# Patient Record
Sex: FEMALE | Race: WHITE | NOT HISPANIC OR LATINO | Employment: OTHER | URBAN - METROPOLITAN AREA
[De-identification: names, ages, dates, MRNs, and addresses within clinical notes are randomized per-mention and may not be internally consistent; named-entity substitution may affect disease eponyms.]

---

## 2017-08-15 ENCOUNTER — TRANSCRIBE ORDERS (OUTPATIENT)
Dept: ADMINISTRATIVE | Facility: HOSPITAL | Age: 62
End: 2017-08-15

## 2017-08-15 ENCOUNTER — HOSPITAL ENCOUNTER (OUTPATIENT)
Dept: GENERAL RADIOLOGY | Facility: HOSPITAL | Age: 62
Discharge: HOME | End: 2017-08-15
Attending: FAMILY MEDICINE | Admitting: FAMILY MEDICINE

## 2017-08-15 DIAGNOSIS — J40 BRONCHITIS: ICD-10-CM

## 2017-08-15 DIAGNOSIS — J40 BRONCHITIS: Primary | ICD-10-CM

## 2017-08-15 PROCEDURE — 71020 HC CHEST PA AND LATERAL: CPT

## 2017-11-30 ENCOUNTER — LAB REQUISITION (OUTPATIENT)
Dept: LAB | Facility: HOSPITAL | Age: 62
End: 2017-11-30

## 2017-11-30 ENCOUNTER — OFFICE VISIT (OUTPATIENT)
Dept: PULMONOLOGY | Facility: CLINIC | Age: 62
End: 2017-11-30

## 2017-11-30 VITALS
SYSTOLIC BLOOD PRESSURE: 128 MMHG | HEART RATE: 84 BPM | TEMPERATURE: 97.5 F | DIASTOLIC BLOOD PRESSURE: 78 MMHG | OXYGEN SATURATION: 97 % | RESPIRATION RATE: 16 BRPM | WEIGHT: 141 LBS | BODY MASS INDEX: 25.95 KG/M2 | HEIGHT: 62 IN

## 2017-11-30 DIAGNOSIS — R05.9 COUGH: Primary | ICD-10-CM

## 2017-11-30 DIAGNOSIS — J45.991 COUGH VARIANT ASTHMA: ICD-10-CM

## 2017-11-30 DIAGNOSIS — Z00.00 ROUTINE GENERAL MEDICAL EXAMINATION AT A HEALTH CARE FACILITY: ICD-10-CM

## 2017-11-30 DIAGNOSIS — J42 CHRONIC BRONCHITIS, UNSPECIFIED CHRONIC BRONCHITIS TYPE (HCC): ICD-10-CM

## 2017-11-30 LAB
ALBUMIN SERPL-MCNC: 4.3 G/DL (ref 3.2–4.8)
ALBUMIN/GLOB SERPL: 1.4 G/DL (ref 1.5–2.5)
ALP SERPL-CCNC: 97 U/L (ref 25–100)
ALT SERPL W P-5'-P-CCNC: 43 U/L (ref 7–40)
ANION GAP SERPL CALCULATED.3IONS-SCNC: 7 MMOL/L (ref 3–11)
AST SERPL-CCNC: 45 U/L (ref 0–33)
BASOPHILS # BLD AUTO: 0.04 10*3/MM3 (ref 0–0.2)
BASOPHILS NFR BLD AUTO: 1 % (ref 0–1)
BILIRUB SERPL-MCNC: 0.8 MG/DL (ref 0.3–1.2)
BUN BLD-MCNC: 16 MG/DL (ref 9–23)
BUN/CREAT SERPL: 22.9 (ref 7–25)
CALCIUM SPEC-SCNC: 9.4 MG/DL (ref 8.7–10.4)
CHLORIDE SERPL-SCNC: 105 MMOL/L (ref 99–109)
CO2 SERPL-SCNC: 28 MMOL/L (ref 20–31)
CREAT BLD-MCNC: 0.7 MG/DL (ref 0.6–1.3)
CRP SERPL-MCNC: 0.04 MG/DL (ref 0–1)
DEPRECATED RDW RBC AUTO: 41.9 FL (ref 37–54)
EOSINOPHIL # BLD AUTO: 0.07 10*3/MM3 (ref 0–0.3)
EOSINOPHIL NFR BLD AUTO: 1.8 % (ref 0–3)
ERYTHROCYTE [DISTWIDTH] IN BLOOD BY AUTOMATED COUNT: 12.5 % (ref 11.3–14.5)
GFR SERPL CREATININE-BSD FRML MDRD: 85 ML/MIN/1.73
GLOBULIN UR ELPH-MCNC: 3 GM/DL
GLUCOSE BLD-MCNC: 93 MG/DL (ref 70–100)
HCT VFR BLD AUTO: 44.3 % (ref 34.5–44)
HGB BLD-MCNC: 15.5 G/DL (ref 11.5–15.5)
IMM GRANULOCYTES # BLD: 0 10*3/MM3 (ref 0–0.03)
IMM GRANULOCYTES NFR BLD: 0 % (ref 0–0.6)
LYMPHOCYTES # BLD AUTO: 0.97 10*3/MM3 (ref 0.6–4.8)
LYMPHOCYTES NFR BLD AUTO: 24.6 % (ref 24–44)
MCH RBC QN AUTO: 32.3 PG (ref 27–31)
MCHC RBC AUTO-ENTMCNC: 35 G/DL (ref 32–36)
MCV RBC AUTO: 92.3 FL (ref 80–99)
MONOCYTES # BLD AUTO: 0.26 10*3/MM3 (ref 0–1)
MONOCYTES NFR BLD AUTO: 6.6 % (ref 0–12)
NEUTROPHILS # BLD AUTO: 2.61 10*3/MM3 (ref 1.5–8.3)
NEUTROPHILS NFR BLD AUTO: 66 % (ref 41–71)
PLATELET # BLD AUTO: 196 10*3/MM3 (ref 150–450)
PMV BLD AUTO: 10.4 FL (ref 6–12)
POTASSIUM BLD-SCNC: 4.6 MMOL/L (ref 3.5–5.5)
PROT SERPL-MCNC: 7.3 G/DL (ref 5.7–8.2)
RBC # BLD AUTO: 4.8 10*6/MM3 (ref 3.89–5.14)
SODIUM BLD-SCNC: 140 MMOL/L (ref 132–146)
WBC NRBC COR # BLD: 3.95 10*3/MM3 (ref 3.5–10.8)

## 2017-11-30 PROCEDURE — 86003 ALLG SPEC IGE CRUDE XTRC EA: CPT | Performed by: INTERNAL MEDICINE

## 2017-11-30 PROCEDURE — 85025 COMPLETE CBC W/AUTO DIFF WBC: CPT | Performed by: INTERNAL MEDICINE

## 2017-11-30 PROCEDURE — 36415 COLL VENOUS BLD VENIPUNCTURE: CPT | Performed by: INTERNAL MEDICINE

## 2017-11-30 PROCEDURE — 90686 IIV4 VACC NO PRSV 0.5 ML IM: CPT | Performed by: INTERNAL MEDICINE

## 2017-11-30 PROCEDURE — 80053 COMPREHEN METABOLIC PANEL: CPT | Performed by: INTERNAL MEDICINE

## 2017-11-30 PROCEDURE — 94729 DIFFUSING CAPACITY: CPT | Performed by: INTERNAL MEDICINE

## 2017-11-30 PROCEDURE — G0008 ADMIN INFLUENZA VIRUS VAC: HCPCS | Performed by: INTERNAL MEDICINE

## 2017-11-30 PROCEDURE — 94726 PLETHYSMOGRAPHY LUNG VOLUMES: CPT | Performed by: INTERNAL MEDICINE

## 2017-11-30 PROCEDURE — 99204 OFFICE O/P NEW MOD 45 MIN: CPT | Performed by: INTERNAL MEDICINE

## 2017-11-30 PROCEDURE — 94375 RESPIRATORY FLOW VOLUME LOOP: CPT | Performed by: INTERNAL MEDICINE

## 2017-11-30 PROCEDURE — 86140 C-REACTIVE PROTEIN: CPT | Performed by: INTERNAL MEDICINE

## 2017-11-30 PROCEDURE — 82785 ASSAY OF IGE: CPT | Performed by: INTERNAL MEDICINE

## 2017-11-30 RX ORDER — IBUPROFEN 200 MG
400 TABLET ORAL EVERY 6 HOURS PRN
COMMUNITY

## 2017-11-30 RX ORDER — DESVENLAFAXINE SUCCINATE 50 MG/1
50 TABLET, EXTENDED RELEASE ORAL DAILY
Refills: 0 | COMMUNITY
Start: 2017-11-11

## 2017-11-30 RX ORDER — BUPROPION HYDROCHLORIDE 300 MG/1
1 TABLET ORAL EVERY MORNING
Refills: 0 | COMMUNITY
Start: 2017-11-11

## 2017-11-30 NOTE — PROGRESS NOTES
"Pulmonary Clinic  Initial Office Evaluation     REFERRING PHYSICIAN:  Navjot Harris MD    Subjective   Reason for Visit:   Ms. Janna Muñiz is a 62 y.o. female, who presents to [facility] at [place], [place], for evaluation and management of Bronchitis      SUBJECTIVE     Cough for several years. She has had frequent infections, every year, more recently 3-4 \"bronchitis\" in the last 12 months. Last episode, around JUL-SEP, it took several rounds of antibiotics to get rid of. No steroids. CXR back then was normal.    Cough at present is non productive. No sputum production. No hemoptysis. No wheezing. No chest pain. No using any inhalers. She never smoked but she has been exposed to second hand smoking.    She also has frequent urinary infections.    She thinks that she has not been tested for immunodeficiencies.    She denies PND or reflux symptoms.    PMH: She  has a past medical history of Arthritis (1980); Bladder infection; Bronchitis; Depression (2003); and Fibromyalgia (1999).   PSxH: She  has a past surgical history that includes Cholecystectomy (2001); Hysterectomy (2002); and Partial hysterectomy (9991-9580).      Medications:     Current Outpatient Prescriptions:   •  buPROPion XL (WELLBUTRIN XL) 300 MG 24 hr tablet, 1 tablet Daily., Disp: , Rfl: 0  •  desvenlafaxine (PRISTIQ) 50 MG 24 hr tablet, 1 tablet Daily., Disp: , Rfl: 0  •  ibuprofen (ADVIL,MOTRIN) 200 MG tablet, Take 200 mg by mouth Every 6 (Six) Hours As Needed for Mild Pain ., Disp: , Rfl:   No current facility-administered medications for this visit.     Allergies: She is allergic to iodine; sulfa antibiotics; and paxil [paroxetine].   FH: Her family history includes COPD in her father; Cancer in her maternal grandfather; Depression in her father and sister; Heart disease in her brother, maternal grandmother, and mother; Mental illness in her paternal grandmother; No Known Problems in her paternal " "grandfather.   SH: She  reports that she has never smoked. She has never used smokeless tobacco. She reports that she does not drink alcohol or use illicit drugs.  Single     The patient's relevant past medical, surgical and social history were reviewed and updated in Epic as appropriate.    ROS (14):   Review of Systems   Constitutional: Positive for fatigue.   HENT: Positive for rhinorrhea and tinnitus.    Eyes: Positive for redness and itching.   Respiratory: Positive for cough.    Gastrointestinal: Positive for abdominal pain, constipation and nausea.   Endocrine: Positive for polydipsia.   Genitourinary: Positive for hematuria.   Musculoskeletal: Positive for arthralgias, back pain, joint swelling, myalgias, neck pain and neck stiffness.   Neurological: Positive for tremors.   Psychiatric/Behavioral: Positive for dysphoric mood and sleep disturbance. The patient is nervous/anxious.        Objective   OBJECTIVE     Physical Examination   Vitals:    11/30/17 0930   BP: 128/78   Pulse: 84   Resp: 16   Temp: 97.5 °F (36.4 °C)   SpO2: 97%  Comment: RA   Weight: 141 lb (64 kg)   Height: 62\" (157.5 cm)       Body mass index is 25.79 kg/(m^2).    Physical Examination    Constitutional:  No acute distress.   Neck:  Normal nasal mucosa and turbinates. Normal teeth.  Oropharynx clear.  Normal range of motion. Neck supple.   No JVD present. No tracheal deviation present.  No thyromegaly present.    Cardiovascular: Normal rate, regular and rhythm. Normal heart sounds.  No murmurs, gallop or rub.  No peripheral edema.   Respiratory: No respiratory distress. Normal respiratory effort.  Normal breath sounds  Clear to auscultation and percussion.    Abdominal:  Soft. No masses. Non-tender. No distension. No HSM.   Extremities: No digital cyanosis. No clubbing.   Lymphadenopathy: None.   Skin: Skin is warm and dry. No rashes, lesions or ulcers noted.    Neurological:   Alert and Oriented to person, place, and time.   Tremors. " "  Psychiatric:  Normal affect. Normal behavior.     Diagnostic Data    CXR 11/30/2017 NAD     PFTs 11/30/2017:  Normal Spirometry.  Lung volumes are normal.  The diffusing capacity is normal.  FEV1 2.2 L 95%    SpO2 Readings from Last 3 Encounters:   11/30/17 97%       Assessment/Plan   ASSESSMENT / PLAN     Assessment:    1. Chronic cough  1. Chronic Bronchitis  2. Other etiologies like PND, GERD, Asthma can not be ruled out, but seems unlikely from her history.  2. Depression  3. Fibromyalgias  4. Arthritis  5. Non smoker  6. Tremors  7. Frequent infections: \"Bronchitis\" and \"Bladder infections      Plan:  Orders Placed This Encounter   Procedures   • XR Chest PA & Lateral   • CT Chest Hi Resolution   • Comprehensive Metabolic Panel   • Allergens, Zone 8   • IgE   • IgG   • IgM   • IgA   • C-reactive Protein   • CBC Auto Differential   • Pulmonary Function Test   • CBC & Differential     Based on results, we may need to consider Methacholine Challenge test.    Patient Instructions   1. High Resolution CT of Chest (it does not require contrast)  2. Blood work up: Blood count, chemistry and Immunoglobulins levels and allergy tests.  3. Return in 2 weeks with results  4. Pneumovax and Flu shot today.      Return in about 2 weeks (around 12/14/2017).    I discussed the diagnosis, evaluation, and  treatment options with the patient and/or appropriate family members.    Thank you very much for allowing me to participate in the care of your patient.    I spent 45 minutes with the patient. I spent > 50% percent of this time counseling and discussing diagnostic testing.    Moe Denny MD, FACP, FCCP, [facility]  Intensive Care Medicine and Pulmonary Medicine     C.C.: Navjot Harris MD  "

## 2017-11-30 NOTE — PATIENT INSTRUCTIONS
1. High Resolution CT of Chest (it does not require contrast)  2. Blood work up: Blood count, chemistry and Immunoglobulins levels and allergy tests.  3. Return in 2 weeks with results  4. Pneumovax and Flu shot today.

## 2017-12-01 LAB
IGA SERPL-MCNC: 114 MG/DL (ref 87–352)
IGG SERPL-MCNC: 1436 MG/DL (ref 700–1600)
IGM SERPL-MCNC: 142 MG/DL (ref 26–217)
TOTAL IGE SMQN RAST: 3 IU/ML (ref 0–100)

## 2017-12-04 LAB
A ALTERNATA IGE QN: <0.1 KU/L
A FUMIGATUS IGE QN: <0.1 KU/L
AMER ROACH IGE QN: <0.1 KU/L
BAHIA GRASS IGE QN: <0.1 KU/L
BERMUDA GRASS IGE QN: <0.1 KU/L
BOXELDER IGE QN: <0.1 KU/L
C HERBARUM IGE QN: <0.1 KU/L
CAT DANDER IGG QN: <0.1 KU/L
CMN PIGWEED IGE QN: <0.1 KU/L
COMMON RAGWEED IGE QN: <0.1 KU/L
CONV CLASS DESCRIPTION: NORMAL
D FARINAE IGE QN: <0.1 KU/L
D PTERONYSS IGE QN: <0.1 KU/L
DOG DANDER IGE QN: <0.1 KU/L
ENGL PLANTAIN IGE QN: <0.1 KU/L
HAZELNUT POLN IGE QN: <0.1 KU/L
JOHNSON GRASS IGE QN: <0.1 KU/L
KENT BLUE GRASS IGE QN: <0.1 KU/L
M RACEMOSUS IGE QN: <0.1 KU/L
MT JUNIPER IGE QN: <0.1 KU/L
MUGWORT IGE QN: <0.1 KU/L
NETTLE IGE QN: <0.1 KU/L
P NOTATUM IGE QN: <0.1 KU/L
S BOTRYOSUM IGE QN: <0.1 KU/L
SHEEP SORREL IGE QN: <0.1 KU/L
SWEET GUM IGE QN: <0.1 KU/L
T011-IGE MAPLE LEAF SYCAMORE: <0.1 KU/L
WHITE ELM IGE QN: <0.1 KU/L
WHITE HICKORY IGE QN: <0.1 KU/L
WHITE MULBERRY IGE QN: <0.1 KU/L
WHITE OAK IGE QN: <0.1 KU/L

## 2017-12-07 ENCOUNTER — HOSPITAL ENCOUNTER (OUTPATIENT)
Dept: CT IMAGING | Facility: HOSPITAL | Age: 62
Discharge: HOME | End: 2017-12-07
Attending: INTERNAL MEDICINE | Admitting: INTERNAL MEDICINE

## 2017-12-07 DIAGNOSIS — R05.9 COUGH: ICD-10-CM

## 2017-12-07 PROCEDURE — 71250 CT THORAX DX C-: CPT

## 2017-12-14 ENCOUNTER — OFFICE VISIT (OUTPATIENT)
Dept: PULMONOLOGY | Facility: CLINIC | Age: 62
End: 2017-12-14

## 2017-12-14 VITALS
SYSTOLIC BLOOD PRESSURE: 122 MMHG | RESPIRATION RATE: 16 BRPM | BODY MASS INDEX: 25.21 KG/M2 | OXYGEN SATURATION: 96 % | WEIGHT: 137 LBS | TEMPERATURE: 98.1 F | HEART RATE: 78 BPM | DIASTOLIC BLOOD PRESSURE: 80 MMHG | HEIGHT: 62 IN

## 2017-12-14 DIAGNOSIS — R91.1 LUNG NODULE SEEN ON IMAGING STUDY: ICD-10-CM

## 2017-12-14 DIAGNOSIS — C34.11 MALIGNANT NEOPLASM OF UPPER LOBE OF RIGHT LUNG (HCC): ICD-10-CM

## 2017-12-14 DIAGNOSIS — J41.0 SIMPLE CHRONIC BRONCHITIS (HCC): ICD-10-CM

## 2017-12-14 DIAGNOSIS — R91.1 LUNG NODULE: Primary | ICD-10-CM

## 2017-12-14 PROCEDURE — 99214 OFFICE O/P EST MOD 30 MIN: CPT | Performed by: INTERNAL MEDICINE

## 2017-12-14 NOTE — PROGRESS NOTES
"Pulmonary Follow-up Visit     Subjective   Reason for Visit:   Ms. Janna Muñiz is a 62 y.o. female, who present to the clinic for: Follow-up (CT)      SUBJECTIVE   LAST OFFICE VISIT:  11/30/2017    Doing fair.  Still some cough.  Always anxious.  Here to discuss results of tests including HRCT.     Tobacco History: She  reports that she has never smoked. She has never used smokeless tobacco.     The patient's relevant medications, past medical, surgical, family, and social history were reviewed and updated in Epic as appropriate.     ROS:  Review of Systems   Constitutional: Positive for fatigue. Negative for fever.   Eyes: Positive for redness and itching.   Respiratory: Positive for cough.    Cardiovascular: Negative for chest pain.   Gastrointestinal: Positive for constipation. Negative for nausea and vomiting.   Endocrine: Positive for polydipsia.   Genitourinary: Positive for hematuria.   Musculoskeletal: Positive for arthralgias, back pain, joint swelling and myalgias.   Neurological: Positive for tremors.   Psychiatric/Behavioral: Negative for confusion.   All other systems reviewed and are negative.      Objective   OBJECTIVE     Vitals:    12/14/17 1020   BP: 122/80   Pulse: 78   Resp: 16   Temp: 98.1 °F (36.7 °C)   SpO2: 96%  Comment: RA   Weight: 62.1 kg (137 lb)   Height: 157.5 cm (62\")     Body mass index is 25.06 kg/(m^2).    Physical Examination    Constitutional:  No acute distress.   Cardiovascular: Normal rate, regular and rhythm. Normal heart sounds.  No murmurs, gallop or rub.   Respiratory: No respiratory distress. Normal respiratory effort.  Normal breath sounds  Clear to auscultation and percussion.    Abdominal:  Soft. No masses. Non-tender. No distension. No HSM.   Extremities: No digital cyanosis. No clubbing.  No peripheral edema.   Skin: Warm and dry. No rashes, lesions or ulcers noted.    Neurological:   Alert and Oriented to person, place, and time.    Psychiatric:  Normal affect. " "Normal behavior.     Diagnostic Data    Ct Chest Hi Resolution    Result Date: 12/8/2017  1. There is an 8 x 10 mm spiculated ill-defined nodule posterior segment right upper lobe which is considered suspicious. Consider PET datasets. 2. Also, recommend consideration for referral to [facility]. 3. Otherwise, there is micronodular postinflammatory scarring and only small areas of patchy groundglass postinflammatory insults at the lung bases. This could be from low-grade mycobacterium avium complex. 4. Otherwise, there is no widespread interstitial fibrotic lung disease, groundglass disease or perimeter fibrosis of the pulmonary lobular septations. 5. The dominant finding is the worrisome nodule right upper lobe. Please see above description and recommendation. At the very least, followup exam recommended 3 months.      D:  12/08/2017 E:  12/08/2017  This report was finalized on 12/8/2017 3:04 PM by Dr. Shawn Boothe MD.          SpO2 Readings from Last 3 Encounters:   12/14/17 96%   11/30/17 97%       Assessment/Plan   ASSESSMENT / PLAN     12/7/2017      Assessment:    1. RUL, posterior segment, 8 x 10 mm spiculated nodule.  1. Suspicious for cancer. Anastasia's probability: 21% (as a non smoker) - 36% (as a smoker, \"second hand smoke\")  2. Chronic Cough  3. Fibromyalgia  4. Anxiety  5. Tremors  6. Non smoker, but exposed to second hand smoking.      Plan:  Orders Placed This Encounter   Procedures   • CT Chest Without Contrast   • NM Pet Whole Body   • Fungal Antibodies, Quant   • QuantiFERON TB Client Incubated   • Cryptococcal Antigen     Patient Instructions   1. CT with iLogic Protocol  2. PET Scan  3. RTC with results. Evaluate for NAVIGATION Bronch vs Surgical resection  4. Fungal serologies and QTF GOLD (Tuberculosis)  5. Notify Lung Navigator RN    Return in about 1 week (around 12/21/2017) for Recheck with results.    I discussed the diagnosis, evaluation, and  treatment options with " the patient and/or appropriate family members.    Thank you very much for allowing me to participate in the care of your patient.    I spent 25 minutes with the patient. I spent > 50% percent of this time counseling and discussing diagnosis, diagnostic testing, evaluation and management.    Moe Denny MD, FACP, FCCP, [facility]  Intensive Care Medicine and Pulmonary Medicine     C.C.: Navjot Harris MD

## 2017-12-14 NOTE — PATIENT INSTRUCTIONS
1. CT with iLogic Protocol  2. PET Scan  3. RTC with results. Evaluate for NAVIGATION Bronch vs Surgical resection  4. Fungal serologies and QTF GOLD (Tuberculosis)  5. Notify Lung Navigator RN

## 2017-12-15 ENCOUNTER — TELEPHONE (OUTPATIENT)
Dept: OTHER | Facility: HOSPITAL | Age: 62
End: 2017-12-15

## 2017-12-18 ENCOUNTER — TELEPHONE (OUTPATIENT)
Dept: OTHER | Facility: HOSPITAL | Age: 62
End: 2017-12-18

## 2017-12-18 NOTE — TELEPHONE ENCOUNTER
Called and spoke with patient per Dr. Denny request. Introduced lung navigator role and provided contact information. Patient states she is still waiting for PET/CT appointment. Called scheduling to notify them patient is ready to schedule PET. Encouraged her to call with questions or concerns. She v/u and agreeable to plan.

## 2017-12-21 ENCOUNTER — TELEPHONE (OUTPATIENT)
Dept: OTHER | Facility: HOSPITAL | Age: 62
End: 2017-12-21

## 2017-12-21 NOTE — TELEPHONE ENCOUNTER
Called patient with PET/CT appointment. She is scheduled 12/28/2017 with 1030 arrival time for 1100 appointment. Educated her on requirements for PET. Notified pulmonary office of appointment. She v/u and agreeable to plan. She knows to call with questions or concerns.

## 2017-12-28 ENCOUNTER — HOSPITAL ENCOUNTER (OUTPATIENT)
Dept: PET IMAGING | Facility: HOSPITAL | Age: 62
Discharge: HOME | End: 2017-12-28
Attending: INTERNAL MEDICINE

## 2017-12-28 ENCOUNTER — HOSPITAL ENCOUNTER (OUTPATIENT)
Dept: PET IMAGING | Facility: HOSPITAL | Age: 62
Discharge: HOME | End: 2017-12-28
Attending: INTERNAL MEDICINE | Admitting: INTERNAL MEDICINE

## 2017-12-28 DIAGNOSIS — R91.1 LUNG NODULE: ICD-10-CM

## 2017-12-28 DIAGNOSIS — C34.11 MALIGNANT NEOPLASM OF UPPER LOBE OF RIGHT LUNG (HCC): ICD-10-CM

## 2017-12-28 LAB — GLUCOSE BLDC GLUCOMTR-MCNC: 119 MG/DL (ref 70–130)

## 2017-12-28 PROCEDURE — 82962 GLUCOSE BLOOD TEST: CPT

## 2017-12-28 PROCEDURE — A9552 F18 FDG: HCPCS | Performed by: INTERNAL MEDICINE

## 2017-12-28 PROCEDURE — 0 FLUDEOXYGLUCOSE F18 SOLUTION: Performed by: INTERNAL MEDICINE

## 2017-12-28 PROCEDURE — 78816 PET IMAGE W/CT FULL BODY: CPT

## 2017-12-28 RX ADMIN — FLUDEOXYGLUCOSE F18 1 DOSE: 300 INJECTION INTRAVENOUS at 10:57

## 2017-12-29 ENCOUNTER — TELEPHONE (OUTPATIENT)
Dept: OTHER | Facility: HOSPITAL | Age: 62
End: 2017-12-29

## 2017-12-29 ENCOUNTER — HOSPITAL ENCOUNTER (OUTPATIENT)
Dept: CT IMAGING | Facility: HOSPITAL | Age: 62
Discharge: HOME | End: 2017-12-29
Attending: INTERNAL MEDICINE | Admitting: INTERNAL MEDICINE

## 2017-12-29 DIAGNOSIS — R91.1 LUNG NODULE: ICD-10-CM

## 2017-12-29 PROCEDURE — 71250 CT THORAX DX C-: CPT

## 2017-12-29 NOTE — TELEPHONE ENCOUNTER
Patient called back and states she would rather be told scan results over the phone instead of at [facility] next week. Spoke with Savannah and permission was given for navigator to provide PET/CT results. Called patient back and provided her with PET/CT impression. Further discussion and plan will need to be done with provider. Patient v/u and knows to call with questions or concerns.

## 2017-12-29 NOTE — TELEPHONE ENCOUNTER
Left patient detailed VM for f/u with Savannah Boothe on 01/02/2018 at 1pm to review scans. Encouraged patient to return call to confirm this appointment.

## 2018-01-02 ENCOUNTER — TELEPHONE (OUTPATIENT)
Dept: OTHER | Facility: HOSPITAL | Age: 63
End: 2018-01-02

## 2018-01-02 NOTE — TELEPHONE ENCOUNTER
Called patient to relay plan. Per Eduin she will have repeat CT chest x6mo but will have f/u in office 01/16/17 at 0945 with ALEXX Ace to address cough and bronchitis. She v/u and agreeable to plan. She knows to call with questions or concerns.

## 2018-01-22 ENCOUNTER — OFFICE VISIT (OUTPATIENT)
Dept: PULMONOLOGY | Facility: CLINIC | Age: 63
End: 2018-01-22

## 2018-01-22 VITALS
HEIGHT: 62 IN | SYSTOLIC BLOOD PRESSURE: 136 MMHG | HEART RATE: 84 BPM | WEIGHT: 139.13 LBS | TEMPERATURE: 98.7 F | DIASTOLIC BLOOD PRESSURE: 90 MMHG | BODY MASS INDEX: 25.6 KG/M2 | RESPIRATION RATE: 18 BRPM | OXYGEN SATURATION: 94 %

## 2018-01-22 DIAGNOSIS — R05.9 COUGH: ICD-10-CM

## 2018-01-22 DIAGNOSIS — R91.1 LUNG NODULE: Primary | ICD-10-CM

## 2018-01-22 PROCEDURE — 99213 OFFICE O/P EST LOW 20 MIN: CPT | Performed by: NURSE PRACTITIONER

## 2018-01-22 RX ORDER — HYDROXYZINE PAMOATE 25 MG/1
CAPSULE ORAL
Refills: 0 | Status: ON HOLD | COMMUNITY
Start: 2018-01-19 | End: 2018-02-07

## 2018-01-22 NOTE — PROGRESS NOTES
[facility] Follow up    CHIEF COMPLAINT    Cough, lung nodule     HISTORY OF PRESENT ILLNESS    Janna Muñiz is a 62 y.o.female lifetime nonsmoker here today for follow up on lung nodule.    She saw Dr Denny as a new patient on 11/30/17 for chronic cough. Part of that workup included a HRCT, which revealed a 8 x 10 mm RUL spiculated nodule. She had a PET scan on 12/28/17 which was negative. She continues to have an occasional cough but denies any more episodes of bronchitis. She denies fever, chills, hemoptysis, on unexpected weight loss.     She is concerned about her immune system. She reports she's had frequent infections including UTIs and URIs over the last year.     There is no problem list on file for this patient.      Allergies   Allergen Reactions   • Iodine Other (See Comments)     blisters   • Sulfa Antibiotics Nausea And Vomiting   • Paxil [Paroxetine] Rash       Current Outpatient Prescriptions:   •  buPROPion XL (WELLBUTRIN XL) 300 MG 24 hr tablet, 1 tablet Daily., Disp: , Rfl: 0  •  desvenlafaxine (PRISTIQ) 50 MG 24 hr tablet, 1 tablet Daily., Disp: , Rfl: 0  •  hydrOXYzine (VISTARIL) 25 MG capsule, take 1 capsule by mouth twice a day if needed, Disp: , Rfl: 0  •  ibuprofen (ADVIL,MOTRIN) 200 MG tablet, Take 200 mg by mouth Every 6 (Six) Hours As Needed for Mild Pain ., Disp: , Rfl:   MEDICATION LIST AND ALLERGIES REVIEWED.    Social History   Substance Use Topics   • Smoking status: Never Smoker   • Smokeless tobacco: Never Used   • Alcohol use No       FAMILY AND SOCIAL HISTORY REVIEWED.    Review of Systems   Constitutional: Negative for chills, fatigue, fever and unexpected weight change.   HENT: Negative for congestion, nosebleeds, postnasal drip, rhinorrhea, sinus pressure and trouble swallowing.    Respiratory: Positive for cough. Negative for chest tightness, shortness of breath and wheezing.    Cardiovascular: Negative for chest pain and leg swelling.   Gastrointestinal:  "Negative for abdominal pain, constipation, diarrhea, nausea and vomiting.   Genitourinary: Negative for dysuria, frequency, hematuria and urgency.   Musculoskeletal: Negative for myalgias.   Neurological: Negative for dizziness, weakness, numbness and headaches.   All other systems reviewed and are negative.  .    /90 (BP Location: Left arm, Patient Position: Sitting)  Pulse 84  Temp 98.7 °F (37.1 °C)  Resp 18  Ht 157.5 cm (62.01\")  Wt 63.1 kg (139 lb 2 oz)  SpO2 94%  BMI 25.44 kg/m2    Physical Exam   Constitutional: She is oriented to person, place, and time. She appears well-developed. No distress.   HENT:   Head: Normocephalic and atraumatic.   Neck: Neck supple.   Cardiovascular: Normal rate and regular rhythm.    No murmur heard.  Pulmonary/Chest: Effort normal and breath sounds normal. No respiratory distress. She has no wheezes.   Abdominal: Soft.   Musculoskeletal: Normal range of motion. She exhibits no edema.   Neurological: She is oriented to person, place, and time.   Skin: Skin is warm and dry. No erythema.   Psychiatric: She has a normal mood and affect. Her behavior is normal.   Vitals reviewed.        RESULTS    Ct Chest Without Contrast    Result Date: 12/30/2017  Stable nodular scarring right upper lobe along with adjacent subcentimeter noncalcified pulmonary nodules in similar appearance to the 12/07/2017 exam. 10 mm spiculated noncalcified nodule has been proven non hypermetabolic on recent PET/CT evaluation dated 12/28/2017. Attention on follow-up examination is recommended within 6 months to ensure stability.  DICTATED:     12/29/2017 EDITED:          12/30/2017  This report was finalized on 12/30/2017 2:25 PM by Dr. Tarik Tavares.      Ct Chest Hi Resolution    Result Date: 12/8/2017  1. There is an 8 x 10 mm spiculated ill-defined nodule posterior segment right upper lobe which is considered suspicious. Consider PET datasets. 2. Also, recommend consideration for referral to " "[facility]. 3. Otherwise, there is micronodular postinflammatory scarring and only small areas of patchy groundglass postinflammatory insults at the lung bases. This could be from low-grade mycobacterium avium complex. 4. Otherwise, there is no widespread interstitial fibrotic lung disease, groundglass disease or perimeter fibrosis of the pulmonary lobular septations. 5. The dominant finding is the worrisome nodule right upper lobe. Please see above description and recommendation. At the very least, followup exam recommended 3 months.  D:  12/08/2017 E:  12/08/2017  This report was finalized on 12/8/2017 3:04 PM by Dr. Shawn Boothe MD.      Nm Pet Whole Body    Result Date: 12/28/2017  Normal examination. There is no increased metabolic activity in a tiny right apical pulmonary nodule and the scan is otherwise, likewise normal.  D:  12/28/2017 E:  12/28/2017    This report was finalized on 12/28/2017 3:23 PM by Dr. Mahesh Narayan MD.        PROBLEM LIST    Problem List Items Addressed This Visit     None      Visit Diagnoses     Lung nodule    -  Primary    Relevant Orders    Ambulatory Referral to Cardiothoracic Surgery    Cough                DISCUSSION    We discussed that a negative PET scan does not exclude low grade malignancy and uptake may be underestimated in nodules < 1 cm. Based on Anastasia's probability, there is a 21% (as a non smoker) - 36% (as a smoker, \"second hand smoke\") likelihood for malignancy. Dr Denny has recommended resection of the nodule and I have conveyed these recommendations to the patient and her daughter. They are agreeable.    I'll make a referral to CT surgery and have also notified lung nurse navigator, Michelle Guzman.    Follow up after surgery.    I spent 15 minutes with the patient and family. I spent > 50% percent of this time counseling and discussing diagnosis, diagnostic testing, current status and treatment options.    Savannah Boothe, " APRN  01/22/20181:16 PM  Electronically signed     Please note that portions of this note were completed with a voice recognition program. Efforts were made to edit the dictations, but occasionally words are mistranscribed.      CC: Navjot Harris MD

## 2018-02-01 ENCOUNTER — OFFICE VISIT (OUTPATIENT)
Dept: CARDIAC SURGERY | Facility: CLINIC | Age: 63
End: 2018-02-01

## 2018-02-01 ENCOUNTER — HOSPITAL ENCOUNTER (OUTPATIENT)
Dept: GENERAL RADIOLOGY | Facility: HOSPITAL | Age: 63
Discharge: HOME | End: 2018-02-01
Admitting: PHYSICIAN ASSISTANT

## 2018-02-01 ENCOUNTER — HOSPITAL ENCOUNTER (OUTPATIENT)
Dept: PULMONOLOGY | Facility: HOSPITAL | Age: 63
Discharge: HOME | End: 2018-02-01

## 2018-02-01 ENCOUNTER — APPOINTMENT (OUTPATIENT)
Dept: PREADMISSION TESTING | Facility: HOSPITAL | Age: 63
End: 2018-02-01

## 2018-02-01 ENCOUNTER — PREP FOR SURGERY (OUTPATIENT)
Dept: OTHER | Facility: HOSPITAL | Age: 63
End: 2018-02-01

## 2018-02-01 VITALS
OXYGEN SATURATION: 93 % | DIASTOLIC BLOOD PRESSURE: 68 MMHG | WEIGHT: 138.8 LBS | SYSTOLIC BLOOD PRESSURE: 109 MMHG | BODY MASS INDEX: 24.59 KG/M2 | HEART RATE: 76 BPM | TEMPERATURE: 98.3 F | HEIGHT: 63 IN

## 2018-02-01 VITALS — HEIGHT: 63 IN | BODY MASS INDEX: 24.57 KG/M2 | WEIGHT: 138.67 LBS | OXYGEN SATURATION: 95 %

## 2018-02-01 DIAGNOSIS — R91.1 LUNG NODULE: ICD-10-CM

## 2018-02-01 DIAGNOSIS — R91.8 MASS OF UPPER LOBE OF RIGHT LUNG: Primary | ICD-10-CM

## 2018-02-01 DIAGNOSIS — R91.1 SOLITARY PULMONARY NODULE: Primary | ICD-10-CM

## 2018-02-01 DIAGNOSIS — R91.1 LUNG NODULE: Primary | ICD-10-CM

## 2018-02-01 DIAGNOSIS — Z01.89 LABORATORY TEST: Primary | ICD-10-CM

## 2018-02-01 LAB
ABO GROUP BLD: NORMAL
ALBUMIN SERPL-MCNC: 4.6 G/DL (ref 3.2–4.8)
ALP SERPL-CCNC: 97 U/L (ref 25–100)
ALT SERPL W P-5'-P-CCNC: 40 U/L (ref 7–40)
ANION GAP SERPL CALCULATED.3IONS-SCNC: 8 MMOL/L (ref 3–11)
AST SERPL-CCNC: 49 U/L (ref 0–33)
BASOPHILS # BLD AUTO: 0.03 10*3/MM3 (ref 0–0.2)
BASOPHILS NFR BLD AUTO: 0.6 % (ref 0–1)
BILIRUB CONJ SERPL-MCNC: 0.3 MG/DL (ref 0–0.2)
BILIRUB INDIRECT SERPL-MCNC: 0.8 MG/DL (ref 0.1–1.1)
BILIRUB SERPL-MCNC: 1.1 MG/DL (ref 0.3–1.2)
BUN BLD-MCNC: 9 MG/DL (ref 9–23)
BUN/CREAT SERPL: 11.3 (ref 7–25)
CALCIUM SPEC-SCNC: 9.7 MG/DL (ref 8.7–10.4)
CHLORIDE SERPL-SCNC: 102 MMOL/L (ref 99–109)
CO2 SERPL-SCNC: 30 MMOL/L (ref 20–31)
CREAT BLD-MCNC: 0.8 MG/DL (ref 0.6–1.3)
DEPRECATED RDW RBC AUTO: 40.4 FL (ref 37–54)
EOSINOPHIL # BLD AUTO: 0.1 10*3/MM3 (ref 0–0.3)
EOSINOPHIL NFR BLD AUTO: 2 % (ref 0–3)
ERYTHROCYTE [DISTWIDTH] IN BLOOD BY AUTOMATED COUNT: 12.3 % (ref 11.3–14.5)
GFR SERPL CREATININE-BSD FRML MDRD: 73 ML/MIN/1.73
GLUCOSE BLD-MCNC: 87 MG/DL (ref 70–100)
HBA1C MFR BLD: 4.9 % (ref 4.8–5.6)
HCT VFR BLD AUTO: 44.4 % (ref 34.5–44)
HGB BLD-MCNC: 15.8 G/DL (ref 11.5–15.5)
IMM GRANULOCYTES # BLD: 0.01 10*3/MM3 (ref 0–0.03)
IMM GRANULOCYTES NFR BLD: 0.2 % (ref 0–0.6)
INR PPP: 1.01
LYMPHOCYTES # BLD AUTO: 1.1 10*3/MM3 (ref 0.6–4.8)
LYMPHOCYTES NFR BLD AUTO: 21.8 % (ref 24–44)
MCH RBC QN AUTO: 32.2 PG (ref 27–31)
MCHC RBC AUTO-ENTMCNC: 35.6 G/DL (ref 32–36)
MCV RBC AUTO: 90.4 FL (ref 80–99)
MONOCYTES # BLD AUTO: 0.35 10*3/MM3 (ref 0–1)
MONOCYTES NFR BLD AUTO: 6.9 % (ref 0–12)
NEUTROPHILS # BLD AUTO: 3.46 10*3/MM3 (ref 1.5–8.3)
NEUTROPHILS NFR BLD AUTO: 68.5 % (ref 41–71)
PLATELET # BLD AUTO: 182 10*3/MM3 (ref 150–450)
PMV BLD AUTO: 9.7 FL (ref 6–12)
POTASSIUM BLD-SCNC: 4.1 MMOL/L (ref 3.5–5.5)
PROT SERPL-MCNC: 7.9 G/DL (ref 5.7–8.2)
PROTHROMBIN TIME: 11 SECONDS (ref 9.6–11.5)
RBC # BLD AUTO: 4.91 10*6/MM3 (ref 3.89–5.14)
RH BLD: POSITIVE
SODIUM BLD-SCNC: 140 MMOL/L (ref 132–146)
WBC NRBC COR # BLD: 5.05 10*3/MM3 (ref 3.5–10.8)

## 2018-02-01 PROCEDURE — 99203 OFFICE O/P NEW LOW 30 MIN: CPT | Performed by: THORACIC SURGERY (CARDIOTHORACIC VASCULAR SURGERY)

## 2018-02-01 PROCEDURE — 93010 ELECTROCARDIOGRAM REPORT: CPT | Performed by: INTERNAL MEDICINE

## 2018-02-01 PROCEDURE — 71046 X-RAY EXAM CHEST 2 VIEWS: CPT

## 2018-02-01 PROCEDURE — 94010 BREATHING CAPACITY TEST: CPT

## 2018-02-01 PROCEDURE — 93005 ELECTROCARDIOGRAM TRACING: CPT

## 2018-02-01 PROCEDURE — 85610 PROTHROMBIN TIME: CPT | Performed by: PHYSICIAN ASSISTANT

## 2018-02-01 PROCEDURE — 94010 BREATHING CAPACITY TEST: CPT | Performed by: INTERNAL MEDICINE

## 2018-02-01 PROCEDURE — 85025 COMPLETE CBC W/AUTO DIFF WBC: CPT | Performed by: PHYSICIAN ASSISTANT

## 2018-02-01 PROCEDURE — 80048 BASIC METABOLIC PNL TOTAL CA: CPT | Performed by: PHYSICIAN ASSISTANT

## 2018-02-01 PROCEDURE — 86901 BLOOD TYPING SEROLOGIC RH(D): CPT

## 2018-02-01 PROCEDURE — 80076 HEPATIC FUNCTION PANEL: CPT | Performed by: PHYSICIAN ASSISTANT

## 2018-02-01 PROCEDURE — 36415 COLL VENOUS BLD VENIPUNCTURE: CPT

## 2018-02-01 PROCEDURE — 83036 HEMOGLOBIN GLYCOSYLATED A1C: CPT | Performed by: PHYSICIAN ASSISTANT

## 2018-02-01 PROCEDURE — 86900 BLOOD TYPING SEROLOGIC ABO: CPT

## 2018-02-01 RX ORDER — CHLORHEXIDINE GLUCONATE 500 MG/1
1 CLOTH TOPICAL EVERY 12 HOURS PRN
Status: ACTIVE | OUTPATIENT
Start: 2018-02-01

## 2018-02-01 RX ORDER — CHLORHEXIDINE GLUCONATE 500 MG/1
1 CLOTH TOPICAL EVERY 12 HOURS PRN
Status: CANCELLED | OUTPATIENT
Start: 2018-02-01

## 2018-02-01 RX ORDER — CEFAZOLIN SODIUM 2 G/100ML
2 INJECTION, SOLUTION INTRAVENOUS ONCE
Status: CANCELLED | OUTPATIENT
Start: 2018-02-01 | End: 2018-02-01

## 2018-02-01 NOTE — PAT
Patient to apply Chlorhexadine wipes  to surgical area (as instructed) the night before procedure and the AM of procedure. Wipes provided.    Pt given bactroban to administer the night before surgery    ra o2 95%    MEASURED FOR TEDS/SCDS IN PAT  CALF MEASUREMENT    13in  LENGTH MEASUREMENT 14in

## 2018-02-01 NOTE — PROGRESS NOTES
02/01/2018  Patient Information  Janna Muñiz                                                                                          [address] [facility]  [address] [place] [address]   1955  'PCP/Referring Physician'  Navjot Harris MD  [phone]  Savannah Boothe, APRN  [phone]  Chief Complaint   Patient presents with   • Lung Nodule     Referred by Dr. Moe Denny for a lung nodule   • Cough     CC: I have a spot on my lung  History of Present Illness: Pleasant 62-year-old  female being seen at the request of Dr. Harris for evaluation of a right upper lobe solitary pulmonary nodule (spiculated, 8-10 millimeter).  For the past 3 months or thereabouts this patient has had a persistent cough and has been treated on several occasions for bronchitis.  Chest x-rays have been negative however recent CT scan revealed a spiculated nodule in the right upper lobe PET scan was read as showing no increase in metabolic activity in the lesion I have reviewed the CT scan.  The lesion in question has the typical appearance of an early carcinoma of the lung.  This patient denies weight loss, hemoptysis, chest pain, shortness of breath, and dyspnea.  She has a lifelong nonsmoker but in childhood had substantial exposure to secondhand smoke (mother).  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  To her knowledge she has not had exposure to asbestos.      Patient Active Problem List   Diagnosis   • Fibromyalgia     Past Medical History:   Diagnosis Date   • Anxiety    • Arthritis 1980   • Bladder infection    • Bronchitis    • Depression 2003    severe   • Fibromyalgia 1999     Past Surgical History:   Procedure Laterality Date   • CHOLECYSTECTOMY  2001   • HYSTERECTOMY  2002   • PARTIAL HYSTERECTOMY  [id number]       Current Outpatient Prescriptions:   •  buPROPion XL (WELLBUTRIN XL) 300 MG 24 hr tablet, 1 tablet Daily., Disp: , Rfl: 0  •  desvenlafaxine (PRISTIQ) 50 MG 24 hr  tablet, 1 tablet Daily., Disp: , Rfl: 0  •  hydrOXYzine (VISTARIL) 25 MG capsule, take 1 capsule by mouth twice a day if needed, Disp: , Rfl: 0  •  ibuprofen (ADVIL,MOTRIN) 200 MG tablet, Take 200 mg by mouth Every 6 (Six) Hours As Needed for Mild Pain ., Disp: , Rfl:   Allergies   Allergen Reactions   • Darvon [Propoxyphene] GI Intolerance   • Iodine Other (See Comments)     blisters   • Percocet [Oxycodone-Acetaminophen] GI Intolerance   • Sulfa Antibiotics Nausea And Vomiting   • Paxil [Paroxetine] Rash     Social History     Social History   • Marital status: Single     Spouse name: N/A   • Number of children: 3   • Years of education: N/A     Occupational History   •  Disabled     Social History Main Topics   • Smoking status: Never Smoker   • Smokeless tobacco: Never Used   • Alcohol use No   • Drug use: No   • Sexual activity: Defer     Other Topics Concern   • Not on file     Social History Narrative    Lives in [place], [place] with granddaughter     Family History   Problem Relation Age of Onset   • Heart disease Mother    • COPD Father    • Depression Father    • Depression Sister    • Heart disease Brother    • Heart disease Maternal Grandmother    • Cancer Maternal Grandfather    • Mental illness Paternal Grandmother    • No Known Problems Paternal Grandfather      Review of Systems   Constitution: Positive for malaise/fatigue. Negative for chills, fever, night sweats and weight loss.   HENT: Negative for hearing loss, odynophagia and sore throat.    Cardiovascular: Negative for chest pain, dyspnea on exertion, leg swelling, orthopnea and palpitations.   Respiratory: Positive for cough. Negative for hemoptysis.    Endocrine: Negative for cold intolerance, heat intolerance, polydipsia, polyphagia and polyuria.   Hematologic/Lymphatic: Bruises/bleeds easily.   Skin: Negative for itching and rash.   Musculoskeletal: Positive for arthritis. Negative for joint pain, joint swelling and myalgias.  "  Gastrointestinal: Positive for constipation. Negative for abdominal pain, diarrhea, hematemesis, hematochezia, melena, nausea and vomiting.   Genitourinary: Negative for dysuria, frequency and hematuria.   Neurological: Negative for focal weakness, headaches, numbness and seizures.   Psychiatric/Behavioral: Positive for depression. Negative for suicidal ideas. The patient is nervous/anxious.    All other systems reviewed and are negative.    Vitals:    02/01/18 0937   BP: 109/68   BP Location: Right arm   Patient Position: Sitting   Pulse: 76   Temp: 98.3 °F (36.8 °C)   SpO2: 93%   Weight: 63 kg (138 lb 12.8 oz)   Height: 160 cm (63\")      Physical Exam   Constitutional: She is oriented to person, place, and time. She appears well-developed and well-nourished. No distress.   HENT:   Head: Normocephalic.   Right Ear: External ear normal.   Left Ear: External ear normal.   Nose: Nose normal.   Eyes: Pupils are equal, round, and reactive to light.   Neck: Normal range of motion. Neck supple. No tracheal deviation present. No thyromegaly present.   Cardiovascular: Normal rate, normal heart sounds and intact distal pulses.    No murmur heard.  Pulmonary/Chest: Effort normal and breath sounds normal. No respiratory distress. She has no wheezes. She has no rales. She exhibits no tenderness.   Abdominal: Soft. Bowel sounds are normal. She exhibits no distension and no mass. There is no tenderness.   Musculoskeletal: Normal range of motion. She exhibits no edema.   Lymphadenopathy:     She has no cervical adenopathy.   Neurological: She is alert and oriented to person, place, and time. She has normal reflexes. No cranial nerve deficit.   Skin: Skin is warm and dry. No rash noted. No erythema.   Psychiatric: She has a normal mood and affect.       Labs/Imaging:I have reviewed this patient's CT scan and PET scan.  She has a 10 mm spiculated nodule in the posterior aspect of the right upper lobe.  I believe that this lesion " represents a cancer.      Assessment:Apparently healthy 62-year-old  female, nonsmoker, with a spiculated lesion in the right upper lobe (probable carcinoma).      Plan:the patient will be scheduled for video-assisted thoracotomy and biopsy with possible right upper lobectomy.  I have discussed the procedure in detail with the patient and with her daughter.  They understand the procedure and are agreeable and ready to proceed.        Patient Active Problem List   Diagnosis   • Fibromyalgia     I have reviewed, verified, and confirmed the above history and current status.  I have examined the patient and confirmed the above physical findings.    Mir Lopez MD  CTSurgery  02/02/18   2:25 PM

## 2018-02-06 ENCOUNTER — ANESTHESIA EVENT (OUTPATIENT)
Dept: PERIOP | Facility: HOSPITAL | Age: 63
End: 2018-02-06

## 2018-02-07 ENCOUNTER — APPOINTMENT (OUTPATIENT)
Dept: GENERAL RADIOLOGY | Facility: HOSPITAL | Age: 63
End: 2018-02-07

## 2018-02-07 ENCOUNTER — ANESTHESIA (OUTPATIENT)
Dept: PERIOP | Facility: HOSPITAL | Age: 63
End: 2018-02-07

## 2018-02-07 ENCOUNTER — HOSPITAL ENCOUNTER (INPATIENT)
Facility: HOSPITAL | Age: 63
LOS: 7 days | Discharge: HOME | End: 2018-02-14
Attending: THORACIC SURGERY (CARDIOTHORACIC VASCULAR SURGERY) | Admitting: THORACIC SURGERY (CARDIOTHORACIC VASCULAR SURGERY)

## 2018-02-07 DIAGNOSIS — R91.1 LUNG NODULE: ICD-10-CM

## 2018-02-07 DIAGNOSIS — Z74.09 IMPAIRED FUNCTIONAL MOBILITY, BALANCE, GAIT, AND ENDURANCE: Primary | ICD-10-CM

## 2018-02-07 DIAGNOSIS — R91.8 MASS OF UPPER LOBE OF RIGHT LUNG: ICD-10-CM

## 2018-02-07 LAB
ABO GROUP BLD: NORMAL
BLD GP AB SCN SERPL QL: NEGATIVE
POTASSIUM BLDA-SCNC: 3.71 MMOL/L (ref 3.5–5.3)
RH BLD: POSITIVE

## 2018-02-07 PROCEDURE — 25010000002 PHENYLEPHRINE PER 1 ML: Performed by: NURSE ANESTHETIST, CERTIFIED REGISTERED

## 2018-02-07 PROCEDURE — C1755 CATHETER, INTRASPINAL: HCPCS | Performed by: ANESTHESIOLOGY

## 2018-02-07 PROCEDURE — 25010000002 MORPHINE PER 10 MG: Performed by: NURSE ANESTHETIST, CERTIFIED REGISTERED

## 2018-02-07 PROCEDURE — C1755 CATHETER, INTRASPINAL: HCPCS | Performed by: THORACIC SURGERY (CARDIOTHORACIC VASCULAR SURGERY)

## 2018-02-07 PROCEDURE — 0BBC0ZX EXCISION OF RIGHT UPPER LUNG LOBE, OPEN APPROACH, DIAGNOSTIC: ICD-10-PCS | Performed by: THORACIC SURGERY (CARDIOTHORACIC VASCULAR SURGERY)

## 2018-02-07 PROCEDURE — 25010000002 PROMETHAZINE PER 50 MG: Performed by: NURSE ANESTHETIST, CERTIFIED REGISTERED

## 2018-02-07 PROCEDURE — 25010000002 NEOSTIGMINE PER 0.5 MG: Performed by: NURSE ANESTHETIST, CERTIFIED REGISTERED

## 2018-02-07 PROCEDURE — 25010000002 ONDANSETRON PER 1 MG: Performed by: NURSE ANESTHETIST, CERTIFIED REGISTERED

## 2018-02-07 PROCEDURE — 94799 UNLISTED PULMONARY SVC/PX: CPT

## 2018-02-07 PROCEDURE — 84132 ASSAY OF SERUM POTASSIUM: CPT | Performed by: ANESTHESIOLOGY

## 2018-02-07 PROCEDURE — 25010000002 FENTANYL CITRATE (PF) 100 MCG/2ML SOLUTION: Performed by: NURSE ANESTHETIST, CERTIFIED REGISTERED

## 2018-02-07 PROCEDURE — 86850 RBC ANTIBODY SCREEN: CPT | Performed by: THORACIC SURGERY (CARDIOTHORACIC VASCULAR SURGERY)

## 2018-02-07 PROCEDURE — 0BBC4ZX EXCISION OF RIGHT UPPER LUNG LOBE, PERCUTANEOUS ENDOSCOPIC APPROACH, DIAGNOSTIC: ICD-10-PCS | Performed by: THORACIC SURGERY (CARDIOTHORACIC VASCULAR SURGERY)

## 2018-02-07 PROCEDURE — 99233 SBSQ HOSP IP/OBS HIGH 50: CPT | Performed by: INTERNAL MEDICINE

## 2018-02-07 PROCEDURE — 86901 BLOOD TYPING SEROLOGIC RH(D): CPT | Performed by: THORACIC SURGERY (CARDIOTHORACIC VASCULAR SURGERY)

## 2018-02-07 PROCEDURE — 0BJ08ZZ INSPECTION OF TRACHEOBRONCHIAL TREE, VIA NATURAL OR ARTIFICIAL OPENING ENDOSCOPIC: ICD-10-PCS | Performed by: THORACIC SURGERY (CARDIOTHORACIC VASCULAR SURGERY)

## 2018-02-07 PROCEDURE — 86900 BLOOD TYPING SEROLOGIC ABO: CPT | Performed by: THORACIC SURGERY (CARDIOTHORACIC VASCULAR SURGERY)

## 2018-02-07 PROCEDURE — 25010000003 MORPHINE PER 10 MG: Performed by: NURSE ANESTHETIST, CERTIFIED REGISTERED

## 2018-02-07 PROCEDURE — C1729 CATH, DRAINAGE: HCPCS | Performed by: THORACIC SURGERY (CARDIOTHORACIC VASCULAR SURGERY)

## 2018-02-07 PROCEDURE — 32097 OPEN WEDGE/BX LUNG NODULE: CPT | Performed by: THORACIC SURGERY (CARDIOTHORACIC VASCULAR SURGERY)

## 2018-02-07 PROCEDURE — 25010000002 HYDROMORPHONE PER 4 MG: Performed by: NURSE ANESTHETIST, CERTIFIED REGISTERED

## 2018-02-07 PROCEDURE — 25010000002 PROPOFOL 10 MG/ML EMULSION: Performed by: NURSE ANESTHETIST, CERTIFIED REGISTERED

## 2018-02-07 PROCEDURE — 94640 AIRWAY INHALATION TREATMENT: CPT

## 2018-02-07 PROCEDURE — 25010000002 DEXAMETHASONE PER 1 MG: Performed by: NURSE ANESTHETIST, CERTIFIED REGISTERED

## 2018-02-07 PROCEDURE — 25010000002 MIDAZOLAM PER 1 MG: Performed by: NURSE ANESTHETIST, CERTIFIED REGISTERED

## 2018-02-07 PROCEDURE — 25010000003 CEFAZOLIN IN DEXTROSE 2-4 GM/100ML-% SOLUTION: Performed by: PHYSICIAN ASSISTANT

## 2018-02-07 PROCEDURE — 88331 PATH CONSLTJ SURG 1 BLK 1SPC: CPT | Performed by: THORACIC SURGERY (CARDIOTHORACIC VASCULAR SURGERY)

## 2018-02-07 PROCEDURE — 32097 OPEN WEDGE/BX LUNG NODULE: CPT | Performed by: PHYSICIAN ASSISTANT

## 2018-02-07 PROCEDURE — 71045 X-RAY EXAM CHEST 1 VIEW: CPT

## 2018-02-07 PROCEDURE — 88312 SPECIAL STAINS GROUP 1: CPT | Performed by: THORACIC SURGERY (CARDIOTHORACIC VASCULAR SURGERY)

## 2018-02-07 PROCEDURE — 94760 N-INVAS EAR/PLS OXIMETRY 1: CPT

## 2018-02-07 PROCEDURE — 31622 DX BRONCHOSCOPE/WASH: CPT | Performed by: THORACIC SURGERY (CARDIOTHORACIC VASCULAR SURGERY)

## 2018-02-07 PROCEDURE — 88307 TISSUE EXAM BY PATHOLOGIST: CPT | Performed by: THORACIC SURGERY (CARDIOTHORACIC VASCULAR SURGERY)

## 2018-02-07 PROCEDURE — 86923 COMPATIBILITY TEST ELECTRIC: CPT

## 2018-02-07 RX ORDER — DEXAMETHASONE SODIUM PHOSPHATE 10 MG/ML
INJECTION INTRAMUSCULAR; INTRAVENOUS AS NEEDED
Status: DISCONTINUED | OUTPATIENT
Start: 2018-02-07 | End: 2018-02-07 | Stop reason: SURG

## 2018-02-07 RX ORDER — MORPHINE SULFATE 0.5 MG/ML
INJECTION, SOLUTION EPIDURAL; INTRATHECAL; INTRAVENOUS AS NEEDED
Status: DISCONTINUED | OUTPATIENT
Start: 2018-02-07 | End: 2018-02-07 | Stop reason: SURG

## 2018-02-07 RX ORDER — MAGNESIUM HYDROXIDE 1200 MG/15ML
LIQUID ORAL AS NEEDED
Status: DISCONTINUED | OUTPATIENT
Start: 2018-02-07 | End: 2018-02-07 | Stop reason: HOSPADM

## 2018-02-07 RX ORDER — FENTANYL CITRATE 50 UG/ML
50 INJECTION, SOLUTION INTRAMUSCULAR; INTRAVENOUS
Status: DISCONTINUED | OUTPATIENT
Start: 2018-02-07 | End: 2018-02-07 | Stop reason: HOSPADM

## 2018-02-07 RX ORDER — ONDANSETRON 4 MG/1
4 TABLET, FILM COATED ORAL EVERY 6 HOURS PRN
Status: DISCONTINUED | OUTPATIENT
Start: 2018-02-07 | End: 2018-02-14 | Stop reason: HOSPADM

## 2018-02-07 RX ORDER — LIDOCAINE HYDROCHLORIDE 10 MG/ML
0.5 INJECTION, SOLUTION EPIDURAL; INFILTRATION; INTRACAUDAL; PERINEURAL ONCE AS NEEDED
Status: COMPLETED | OUTPATIENT
Start: 2018-02-07 | End: 2018-02-07

## 2018-02-07 RX ORDER — SODIUM CHLORIDE 9 MG/ML
30 INJECTION, SOLUTION INTRAVENOUS CONTINUOUS
Status: DISCONTINUED | OUTPATIENT
Start: 2018-02-07 | End: 2018-02-08

## 2018-02-07 RX ORDER — DESVENLAFAXINE SUCCINATE 50 MG/1
50 TABLET, EXTENDED RELEASE ORAL DAILY
Status: DISCONTINUED | OUTPATIENT
Start: 2018-02-07 | End: 2018-02-14 | Stop reason: HOSPADM

## 2018-02-07 RX ORDER — HYDROMORPHONE HYDROCHLORIDE 1 MG/ML
0.25 INJECTION, SOLUTION INTRAMUSCULAR; INTRAVENOUS; SUBCUTANEOUS
Status: DISCONTINUED | OUTPATIENT
Start: 2018-02-07 | End: 2018-02-08

## 2018-02-07 RX ORDER — SODIUM CHLORIDE 0.9 % (FLUSH) 0.9 %
1-10 SYRINGE (ML) INJECTION AS NEEDED
Status: DISCONTINUED | OUTPATIENT
Start: 2018-02-07 | End: 2018-02-10

## 2018-02-07 RX ORDER — HYDROXYZINE PAMOATE 25 MG/1
25 CAPSULE ORAL 2 TIMES DAILY PRN
COMMUNITY

## 2018-02-07 RX ORDER — PROMETHAZINE HYDROCHLORIDE 25 MG/1
25 SUPPOSITORY RECTAL ONCE AS NEEDED
Status: COMPLETED | OUTPATIENT
Start: 2018-02-07 | End: 2018-02-07

## 2018-02-07 RX ORDER — PROMETHAZINE HYDROCHLORIDE 25 MG/ML
6.25 INJECTION, SOLUTION INTRAMUSCULAR; INTRAVENOUS ONCE AS NEEDED
Status: COMPLETED | OUTPATIENT
Start: 2018-02-07 | End: 2018-02-07

## 2018-02-07 RX ORDER — PROMETHAZINE HYDROCHLORIDE 25 MG/1
25 TABLET ORAL ONCE AS NEEDED
Status: COMPLETED | OUTPATIENT
Start: 2018-02-07 | End: 2018-02-07

## 2018-02-07 RX ORDER — FAMOTIDINE 10 MG/ML
20 INJECTION, SOLUTION INTRAVENOUS ONCE
Status: DISCONTINUED | OUTPATIENT
Start: 2018-02-07 | End: 2018-02-07

## 2018-02-07 RX ORDER — NALOXONE HCL 0.4 MG/ML
0.4 VIAL (ML) INJECTION
Status: DISCONTINUED | OUTPATIENT
Start: 2018-02-07 | End: 2018-02-08

## 2018-02-07 RX ORDER — CEFAZOLIN SODIUM 2 G/100ML
2 INJECTION, SOLUTION INTRAVENOUS EVERY 8 HOURS
Status: COMPLETED | OUTPATIENT
Start: 2018-02-07 | End: 2018-02-08

## 2018-02-07 RX ORDER — LIDOCAINE HYDROCHLORIDE 10 MG/ML
INJECTION, SOLUTION INFILTRATION; PERINEURAL AS NEEDED
Status: DISCONTINUED | OUTPATIENT
Start: 2018-02-07 | End: 2018-02-07 | Stop reason: SURG

## 2018-02-07 RX ORDER — GLYCOPYRROLATE 0.2 MG/ML
INJECTION INTRAMUSCULAR; INTRAVENOUS AS NEEDED
Status: DISCONTINUED | OUTPATIENT
Start: 2018-02-07 | End: 2018-02-07 | Stop reason: SURG

## 2018-02-07 RX ORDER — CEFAZOLIN SODIUM 2 G/100ML
2 INJECTION, SOLUTION INTRAVENOUS ONCE
Status: COMPLETED | OUTPATIENT
Start: 2018-02-07 | End: 2018-02-07

## 2018-02-07 RX ORDER — HEPARIN SODIUM 5000 [USP'U]/ML
5000 INJECTION, SOLUTION INTRAVENOUS; SUBCUTANEOUS EVERY 12 HOURS SCHEDULED
Status: DISCONTINUED | OUTPATIENT
Start: 2018-02-08 | End: 2018-02-13

## 2018-02-07 RX ORDER — ONDANSETRON 2 MG/ML
INJECTION INTRAMUSCULAR; INTRAVENOUS AS NEEDED
Status: DISCONTINUED | OUTPATIENT
Start: 2018-02-07 | End: 2018-02-07 | Stop reason: SURG

## 2018-02-07 RX ORDER — PROPOFOL 10 MG/ML
VIAL (ML) INTRAVENOUS AS NEEDED
Status: DISCONTINUED | OUTPATIENT
Start: 2018-02-07 | End: 2018-02-07 | Stop reason: SURG

## 2018-02-07 RX ORDER — NALOXONE HCL 0.4 MG/ML
0.4 VIAL (ML) INJECTION
Status: DISCONTINUED | OUTPATIENT
Start: 2018-02-07 | End: 2018-02-13

## 2018-02-07 RX ORDER — OXYCODONE HYDROCHLORIDE AND ACETAMINOPHEN 5; 325 MG/1; MG/1
1 TABLET ORAL EVERY 4 HOURS PRN
Status: DISCONTINUED | OUTPATIENT
Start: 2018-02-07 | End: 2018-02-14 | Stop reason: HOSPADM

## 2018-02-07 RX ORDER — HYDROMORPHONE HYDROCHLORIDE 1 MG/ML
0.5 INJECTION, SOLUTION INTRAMUSCULAR; INTRAVENOUS; SUBCUTANEOUS
Status: DISCONTINUED | OUTPATIENT
Start: 2018-02-07 | End: 2018-02-07 | Stop reason: HOSPADM

## 2018-02-07 RX ORDER — FENTANYL CITRATE 50 UG/ML
INJECTION, SOLUTION INTRAMUSCULAR; INTRAVENOUS AS NEEDED
Status: DISCONTINUED | OUTPATIENT
Start: 2018-02-07 | End: 2018-02-07 | Stop reason: SURG

## 2018-02-07 RX ORDER — BUPROPION HYDROCHLORIDE 150 MG/1
300 TABLET ORAL EVERY MORNING
Status: DISCONTINUED | OUTPATIENT
Start: 2018-02-07 | End: 2018-02-14 | Stop reason: HOSPADM

## 2018-02-07 RX ORDER — SODIUM CHLORIDE, SODIUM LACTATE, POTASSIUM CHLORIDE, CALCIUM CHLORIDE 600; 310; 30; 20 MG/100ML; MG/100ML; MG/100ML; MG/100ML
9 INJECTION, SOLUTION INTRAVENOUS CONTINUOUS
Status: DISCONTINUED | OUTPATIENT
Start: 2018-02-07 | End: 2018-02-08

## 2018-02-07 RX ORDER — SODIUM CHLORIDE 9 MG/ML
INJECTION, SOLUTION INTRAVENOUS AS NEEDED
Status: DISCONTINUED | OUTPATIENT
Start: 2018-02-07 | End: 2018-02-07 | Stop reason: HOSPADM

## 2018-02-07 RX ORDER — MIDAZOLAM HYDROCHLORIDE 1 MG/ML
INJECTION INTRAMUSCULAR; INTRAVENOUS AS NEEDED
Status: DISCONTINUED | OUTPATIENT
Start: 2018-02-07 | End: 2018-02-07 | Stop reason: SURG

## 2018-02-07 RX ORDER — IPRATROPIUM BROMIDE AND ALBUTEROL SULFATE 2.5; .5 MG/3ML; MG/3ML
3 SOLUTION RESPIRATORY (INHALATION)
Status: DISCONTINUED | OUTPATIENT
Start: 2018-02-07 | End: 2018-02-09

## 2018-02-07 RX ORDER — ONDANSETRON 2 MG/ML
4 INJECTION INTRAMUSCULAR; INTRAVENOUS EVERY 6 HOURS PRN
Status: DISCONTINUED | OUTPATIENT
Start: 2018-02-07 | End: 2018-02-14 | Stop reason: HOSPADM

## 2018-02-07 RX ORDER — MORPHINE SULFATE 2 MG/ML
4 INJECTION, SOLUTION INTRAMUSCULAR; INTRAVENOUS EVERY 4 HOURS PRN
Status: DISCONTINUED | OUTPATIENT
Start: 2018-02-07 | End: 2018-02-08

## 2018-02-07 RX ORDER — BUPIVACAINE HYDROCHLORIDE 2.5 MG/ML
INJECTION, SOLUTION EPIDURAL; INFILTRATION; INTRACAUDAL AS NEEDED
Status: DISCONTINUED | OUTPATIENT
Start: 2018-02-07 | End: 2018-02-07 | Stop reason: SURG

## 2018-02-07 RX ORDER — HYDROCODONE BITARTRATE AND ACETAMINOPHEN 7.5; 325 MG/1; MG/1
1 TABLET ORAL EVERY 4 HOURS PRN
Status: DISCONTINUED | OUTPATIENT
Start: 2018-02-07 | End: 2018-02-14 | Stop reason: HOSPADM

## 2018-02-07 RX ORDER — SODIUM CHLORIDE 0.9 % (FLUSH) 0.9 %
1-10 SYRINGE (ML) INJECTION AS NEEDED
Status: DISCONTINUED | OUTPATIENT
Start: 2018-02-07 | End: 2018-02-07 | Stop reason: HOSPADM

## 2018-02-07 RX ORDER — ONDANSETRON 2 MG/ML
4 INJECTION INTRAMUSCULAR; INTRAVENOUS EVERY 6 HOURS PRN
Status: DISCONTINUED | OUTPATIENT
Start: 2018-02-07 | End: 2018-02-08

## 2018-02-07 RX ORDER — ROCURONIUM BROMIDE 10 MG/ML
INJECTION, SOLUTION INTRAVENOUS AS NEEDED
Status: DISCONTINUED | OUTPATIENT
Start: 2018-02-07 | End: 2018-02-07 | Stop reason: SURG

## 2018-02-07 RX ORDER — FAMOTIDINE 20 MG/1
20 TABLET, FILM COATED ORAL ONCE
Status: COMPLETED | OUTPATIENT
Start: 2018-02-07 | End: 2018-02-07

## 2018-02-07 RX ORDER — PROMETHAZINE HYDROCHLORIDE 25 MG/ML
6.25 INJECTION, SOLUTION INTRAMUSCULAR; INTRAVENOUS EVERY 4 HOURS PRN
Status: DISCONTINUED | OUTPATIENT
Start: 2018-02-07 | End: 2018-02-14 | Stop reason: HOSPADM

## 2018-02-07 RX ADMIN — PHENYLEPHRINE HYDROCHLORIDE 80 MCG: 10 INJECTION INTRAVENOUS at 08:12

## 2018-02-07 RX ADMIN — HYDROMORPHONE HYDROCHLORIDE 0.25 MG: 10 INJECTION, SOLUTION INTRAMUSCULAR; INTRAVENOUS; SUBCUTANEOUS at 11:49

## 2018-02-07 RX ADMIN — CEFAZOLIN SODIUM 2 G: 2 INJECTION, SOLUTION INTRAVENOUS at 15:37

## 2018-02-07 RX ADMIN — BUPROPION HYDROCHLORIDE 300 MG: 150 TABLET, FILM COATED, EXTENDED RELEASE ORAL at 15:33

## 2018-02-07 RX ADMIN — HYDROMORPHONE HYDROCHLORIDE 0.25 MG: 10 INJECTION, SOLUTION INTRAMUSCULAR; INTRAVENOUS; SUBCUTANEOUS at 22:29

## 2018-02-07 RX ADMIN — ROCURONIUM BROMIDE 10 MG: 10 SOLUTION INTRAVENOUS at 08:12

## 2018-02-07 RX ADMIN — BUPIVACAINE HYDROCHLORIDE: 5 INJECTION, SOLUTION EPIDURAL; INTRACAUDAL; PERINEURAL at 10:47

## 2018-02-07 RX ADMIN — LIDOCAINE HYDROCHLORIDE 30 MG: 10 INJECTION, SOLUTION INFILTRATION; PERINEURAL at 07:45

## 2018-02-07 RX ADMIN — MORPHINE SULFATE 2.5 MG: 0.5 INJECTION, SOLUTION EPIDURAL; INTRATHECAL; INTRAVENOUS at 09:06

## 2018-02-07 RX ADMIN — EPHEDRINE SULFATE 10 MG: 50 INJECTION INTRAMUSCULAR; INTRAVENOUS; SUBCUTANEOUS at 08:34

## 2018-02-07 RX ADMIN — ONDANSETRON 4 MG: 2 INJECTION INTRAMUSCULAR; INTRAVENOUS at 09:09

## 2018-02-07 RX ADMIN — SODIUM CHLORIDE 30 ML/HR: 9 INJECTION, SOLUTION INTRAVENOUS at 11:49

## 2018-02-07 RX ADMIN — SODIUM CHLORIDE, POTASSIUM CHLORIDE, SODIUM LACTATE AND CALCIUM CHLORIDE 9 ML/HR: 600; 310; 30; 20 INJECTION, SOLUTION INTRAVENOUS at 06:05

## 2018-02-07 RX ADMIN — HYDROMORPHONE HYDROCHLORIDE 0.25 MG: 10 INJECTION, SOLUTION INTRAMUSCULAR; INTRAVENOUS; SUBCUTANEOUS at 17:45

## 2018-02-07 RX ADMIN — PROPOFOL 150 MG: 10 INJECTION, EMULSION INTRAVENOUS at 07:45

## 2018-02-07 RX ADMIN — PHENYLEPHRINE HYDROCHLORIDE 80 MCG: 10 INJECTION INTRAVENOUS at 08:50

## 2018-02-07 RX ADMIN — BUPIVACAINE HYDROCHLORIDE 5 ML: 2.5 INJECTION, SOLUTION EPIDURAL; INFILTRATION; INTRACAUDAL; PERINEURAL at 09:23

## 2018-02-07 RX ADMIN — LIDOCAINE HYDROCHLORIDE 0.2 ML: 10 INJECTION, SOLUTION EPIDURAL; INFILTRATION; INTRACAUDAL; PERINEURAL at 06:11

## 2018-02-07 RX ADMIN — PHENYLEPHRINE HYDROCHLORIDE 80 MCG: 10 INJECTION INTRAVENOUS at 07:51

## 2018-02-07 RX ADMIN — FAMOTIDINE 20 MG: 20 TABLET, FILM COATED ORAL at 06:11

## 2018-02-07 RX ADMIN — PROMETHAZINE HYDROCHLORIDE 6.25 MG: 25 INJECTION INTRAMUSCULAR; INTRAVENOUS at 10:18

## 2018-02-07 RX ADMIN — MIDAZOLAM HYDROCHLORIDE 2 MG: 1 INJECTION, SOLUTION INTRAMUSCULAR; INTRAVENOUS at 07:31

## 2018-02-07 RX ADMIN — CEFAZOLIN SODIUM 2 G: 2 INJECTION, SOLUTION INTRAVENOUS at 07:26

## 2018-02-07 RX ADMIN — GLYCOPYRROLATE 0.4 MG: 0.2 INJECTION, SOLUTION INTRAMUSCULAR; INTRAVENOUS at 09:17

## 2018-02-07 RX ADMIN — CEFAZOLIN SODIUM 2 G: 2 INJECTION, SOLUTION INTRAVENOUS at 23:49

## 2018-02-07 RX ADMIN — IPRATROPIUM BROMIDE AND ALBUTEROL SULFATE 3 ML: .5; 3 SOLUTION RESPIRATORY (INHALATION) at 19:50

## 2018-02-07 RX ADMIN — EPHEDRINE SULFATE 10 MG: 50 INJECTION INTRAMUSCULAR; INTRAVENOUS; SUBCUTANEOUS at 08:32

## 2018-02-07 RX ADMIN — PHENYLEPHRINE HYDROCHLORIDE 80 MCG: 10 INJECTION INTRAVENOUS at 08:48

## 2018-02-07 RX ADMIN — EPHEDRINE SULFATE 5 MG: 50 INJECTION INTRAMUSCULAR; INTRAVENOUS; SUBCUTANEOUS at 08:33

## 2018-02-07 RX ADMIN — Medication 2.5 MG: at 09:17

## 2018-02-07 RX ADMIN — IPRATROPIUM BROMIDE AND ALBUTEROL SULFATE 3 ML: .5; 3 SOLUTION RESPIRATORY (INHALATION) at 12:16

## 2018-02-07 RX ADMIN — ROCURONIUM BROMIDE 30 MG: 10 SOLUTION INTRAVENOUS at 07:45

## 2018-02-07 RX ADMIN — DEXAMETHASONE SODIUM PHOSPHATE 8 MG: 10 INJECTION INTRAMUSCULAR; INTRAVENOUS at 08:14

## 2018-02-07 RX ADMIN — PHENYLEPHRINE HYDROCHLORIDE 160 MCG: 10 INJECTION INTRAVENOUS at 07:52

## 2018-02-07 RX ADMIN — FENTANYL CITRATE 100 MCG: 50 INJECTION, SOLUTION INTRAMUSCULAR; INTRAVENOUS at 07:32

## 2018-02-07 RX ADMIN — DESVENLAFAXINE SUCCINATE 50 MG: 50 TABLET, EXTENDED RELEASE ORAL at 15:32

## 2018-02-07 RX ADMIN — Medication: at 10:47

## 2018-02-07 RX ADMIN — MUPIROCIN 1 APPLICATION: 20 OINTMENT TOPICAL at 06:11

## 2018-02-07 RX ADMIN — ROCURONIUM BROMIDE 10 MG: 10 SOLUTION INTRAVENOUS at 08:40

## 2018-02-07 RX ADMIN — HYDROMORPHONE HYDROCHLORIDE 0.5 MG: 10 INJECTION, SOLUTION INTRAMUSCULAR; INTRAVENOUS; SUBCUTANEOUS at 10:10

## 2018-02-07 RX ADMIN — PROMETHAZINE HYDROCHLORIDE 6.25 MG: 25 INJECTION INTRAMUSCULAR; INTRAVENOUS at 17:46

## 2018-02-07 RX ADMIN — HYDROMORPHONE HYDROCHLORIDE 0.25 MG: 10 INJECTION, SOLUTION INTRAMUSCULAR; INTRAVENOUS; SUBCUTANEOUS at 20:45

## 2018-02-07 RX ADMIN — MORPHINE SULFATE 2.5 MG: 0.5 INJECTION, SOLUTION EPIDURAL; INTRATHECAL; INTRAVENOUS at 09:21

## 2018-02-07 NOTE — ANESTHESIA PREPROCEDURE EVALUATION
Anesthesia Evaluation     Patient summary reviewed and Nursing notes reviewed   no history of anesthetic complications:  NPO Solid Status: > 8 hours  NPO Liquid Status: > 8 hours           Airway   Mallampati: II  TM distance: >3 FB  Neck ROM: full  no difficulty expected  Dental - normal exam     Pulmonary - normal exam    breath sounds clear to auscultation    ROS comment: Lung mass  Cardiovascular - negative cardio ROS and normal exam    ECG reviewed  Rhythm: regular  Rate: normal        Neuro/Psych- negative ROS  GI/Hepatic/Renal/Endo - negative ROS     Musculoskeletal     Abdominal    Substance History - negative use     OB/GYN          Other   (+) arthritis                     Anesthesia Plan    ASA 2     general     intravenous induction   Anesthetic plan and risks discussed with patient.    Plan discussed with CRNA.

## 2018-02-07 NOTE — PLAN OF CARE
Problem: Patient Care Overview (Adult)  Goal: Plan of Care Review  Outcome: Ongoing (interventions implemented as appropriate)   02/07/18 0550   Coping/Psychosocial Response Interventions   Plan Of Care Reviewed With patient   Patient Care Overview   Progress improving

## 2018-02-07 NOTE — ANESTHESIA PROCEDURE NOTES
Airway  Urgency: elective    Airway not difficult    General Information and Staff    Patient location during procedure: OR  CRNA: SUJATA KNUTSON    Indications and Patient Condition  Indications for airway management: airway protection    Preoxygenated: yes  MILS maintained throughout  Mask difficulty assessment: 2 - vent by mask + OA or adjuvant +/- NMBA    Final Airway Details  Final airway type: endotracheal airway      Successful airway: ETT - double lumen left  Cuffed: yes   Successful intubation technique: direct laryngoscopy  Facilitating devices/methods: intubating stylet  Endotracheal tube insertion site: oral  Blade: Restrepo  Blade size: #2  ETT DL size: 35 fr  Cormack-Lehane Classification: grade I - full view of glottis  Placement verified by: chest auscultation, bronchoscopy and capnometry   Measured from: lips  Number of attempts at approach: 1    Additional Comments  After the bronchoscopy per Dr. Lopez, regular OETT d/c's and pt intubated with #35 DL OETT. Atraumatic intubation. +ETCO2. +BBS. OETT placement verified with bronchoscopy.

## 2018-02-07 NOTE — H&P (VIEW-ONLY)
02/01/2018  Patient Information  Janna Muñiz                                                                                          [address] [facility]  [address] [place] [address]   1955  'PCP/Referring Physician'  Navjot Harris MD  [phone]  Savannah Boothe, APRN  [phone]  Chief Complaint   Patient presents with   • Lung Nodule     Referred by Dr. Moe Denny for a lung nodule   • Cough     CC: I have a spot on my lung  History of Present Illness: Pleasant 62-year-old  female being seen at the request of Dr. Harris for evaluation of a right upper lobe solitary pulmonary nodule (spiculated, 8-10 millimeter).  For the past 3 months or thereabouts this patient has had a persistent cough and has been treated on several occasions for bronchitis.  Chest x-rays have been negative however recent CT scan revealed a spiculated nodule in the right upper lobe PET scan was read as showing no increase in metabolic activity in the lesion I have reviewed the CT scan.  The lesion in question has the typical appearance of an early carcinoma of the lung.  This patient denies weight loss, hemoptysis, chest pain, shortness of breath, and dyspnea.  She has a lifelong nonsmoker but in childhood had substantial exposure to secondhand smoke (mother).  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  To her knowledge she has not had exposure to asbestos.      Patient Active Problem List   Diagnosis   • Fibromyalgia     Past Medical History:   Diagnosis Date   • Anxiety    • Arthritis 1980   • Bladder infection    • Bronchitis    • Depression 2003    severe   • Fibromyalgia 1999     Past Surgical History:   Procedure Laterality Date   • CHOLECYSTECTOMY  2001   • HYSTERECTOMY  2002   • PARTIAL HYSTERECTOMY  [id number]       Current Outpatient Prescriptions:   •  buPROPion XL (WELLBUTRIN XL) 300 MG 24 hr tablet, 1 tablet Daily., Disp: , Rfl: 0  •  desvenlafaxine (PRISTIQ) 50 MG 24 hr  tablet, 1 tablet Daily., Disp: , Rfl: 0  •  hydrOXYzine (VISTARIL) 25 MG capsule, take 1 capsule by mouth twice a day if needed, Disp: , Rfl: 0  •  ibuprofen (ADVIL,MOTRIN) 200 MG tablet, Take 200 mg by mouth Every 6 (Six) Hours As Needed for Mild Pain ., Disp: , Rfl:   Allergies   Allergen Reactions   • Darvon [Propoxyphene] GI Intolerance   • Iodine Other (See Comments)     blisters   • Percocet [Oxycodone-Acetaminophen] GI Intolerance   • Sulfa Antibiotics Nausea And Vomiting   • Paxil [Paroxetine] Rash     Social History     Social History   • Marital status: Single     Spouse name: N/A   • Number of children: 3   • Years of education: N/A     Occupational History   •  Disabled     Social History Main Topics   • Smoking status: Never Smoker   • Smokeless tobacco: Never Used   • Alcohol use No   • Drug use: No   • Sexual activity: Defer     Other Topics Concern   • Not on file     Social History Narrative    Lives in [place], [place] with granddaughter     Family History   Problem Relation Age of Onset   • Heart disease Mother    • COPD Father    • Depression Father    • Depression Sister    • Heart disease Brother    • Heart disease Maternal Grandmother    • Cancer Maternal Grandfather    • Mental illness Paternal Grandmother    • No Known Problems Paternal Grandfather      Review of Systems   Constitution: Positive for malaise/fatigue. Negative for chills, fever, night sweats and weight loss.   HENT: Negative for hearing loss, odynophagia and sore throat.    Cardiovascular: Negative for chest pain, dyspnea on exertion, leg swelling, orthopnea and palpitations.   Respiratory: Positive for cough. Negative for hemoptysis.    Endocrine: Negative for cold intolerance, heat intolerance, polydipsia, polyphagia and polyuria.   Hematologic/Lymphatic: Bruises/bleeds easily.   Skin: Negative for itching and rash.   Musculoskeletal: Positive for arthritis. Negative for joint pain, joint swelling and myalgias.  "  Gastrointestinal: Positive for constipation. Negative for abdominal pain, diarrhea, hematemesis, hematochezia, melena, nausea and vomiting.   Genitourinary: Negative for dysuria, frequency and hematuria.   Neurological: Negative for focal weakness, headaches, numbness and seizures.   Psychiatric/Behavioral: Positive for depression. Negative for suicidal ideas. The patient is nervous/anxious.    All other systems reviewed and are negative.    Vitals:    02/01/18 0937   BP: 109/68   BP Location: Right arm   Patient Position: Sitting   Pulse: 76   Temp: 98.3 °F (36.8 °C)   SpO2: 93%   Weight: 63 kg (138 lb 12.8 oz)   Height: 160 cm (63\")      Physical Exam   Constitutional: She is oriented to person, place, and time. She appears well-developed and well-nourished. No distress.   HENT:   Head: Normocephalic.   Right Ear: External ear normal.   Left Ear: External ear normal.   Nose: Nose normal.   Eyes: Pupils are equal, round, and reactive to light.   Neck: Normal range of motion. Neck supple. No tracheal deviation present. No thyromegaly present.   Cardiovascular: Normal rate, normal heart sounds and intact distal pulses.    No murmur heard.  Pulmonary/Chest: Effort normal and breath sounds normal. No respiratory distress. She has no wheezes. She has no rales. She exhibits no tenderness.   Abdominal: Soft. Bowel sounds are normal. She exhibits no distension and no mass. There is no tenderness.   Musculoskeletal: Normal range of motion. She exhibits no edema.   Lymphadenopathy:     She has no cervical adenopathy.   Neurological: She is alert and oriented to person, place, and time. She has normal reflexes. No cranial nerve deficit.   Skin: Skin is warm and dry. No rash noted. No erythema.   Psychiatric: She has a normal mood and affect.       Labs/Imaging:I have reviewed this patient's CT scan and PET scan.  She has a 10 mm spiculated nodule in the posterior aspect of the right upper lobe.  I believe that this lesion " represents a cancer.      Assessment:Apparently healthy 62-year-old  female, nonsmoker, with a spiculated lesion in the right upper lobe (probable carcinoma).      Plan:the patient will be scheduled for video-assisted thoracotomy and biopsy with possible right upper lobectomy.  I have discussed the procedure in detail with the patient and with her daughter.  They understand the procedure and are agreeable and ready to proceed.        Patient Active Problem List   Diagnosis   • Fibromyalgia     I have reviewed, verified, and confirmed the above history and current status.  I have examined the patient and confirmed the above physical findings.    Mir Lopez MD  CTSurgery  02/02/18   2:25 PM

## 2018-02-07 NOTE — PROGRESS NOTES
"Critical Care Note     LOS: 0 days   Patient Care Team:  Navjot Harris MD as PCP - General (Family Medicine)    Chief Complaint/Reason for visit:  Lung nodule, postoperative management of electrolytes and glycemic control      Subjective     Interval History:   62-year-old woman nonsmoker evaluated in our office for chronic cough. CT scan revealed an 8 x 10 mm spiculated nodule in the right upper lobe posteriorly.PET scan was negative but that is inconclusive and a nodule this size. Today she underwent right thoracotomy. She is currently drowsy and resting post pain medication and unable to give me additional history. Nursing reports she has had no nausea or vomiting.      Review of Systems:    All systems were reviewed and negative except as noted in subjective.    Medical history, surgical history, social history, family history reviewed  Anxiety and depression, frequent urinary tract infections, fibromyalgia, osteoarthritis  Cholecystectomy, hysterectomy   Multiple drug allergies, iodine, Percocet, sulfa, Paxil, Darvon  Single, 3 children, currently living with her granddaughter  Objective     Intake/Output:    Intake/Output Summary (Last 24 hours) at 02/07/18 1200  Last data filed at 02/07/18 1100   Gross per 24 hour   Intake              800 ml   Output              445 ml   Net              355 ml       Nutrition:  Diet Regular    Infusions:    lactated ringers 9 mL/hr Last Rate: 9 mL/hr (02/07/18 0605)   Morphine and bupivacaine epidural  Last Rate: 3 mL/hr at 02/07/18 1047   niCARdipine in NaCl 5-15 mg/hr    phenylephrine (FEDERICO-SYNEPHRINE) 50 mg/250 (0.2 mg/mL) infusion 0.5-3 mcg/kg/min    sodium chloride 30 mL/hr Last Rate: 30 mL/hr (02/07/18 1149)       Telemetry:  Sinus Rhythm: normal sinus rhythm          Vital Signs  Blood pressure 96/51, pulse 74, temperature 97.1 °F (36.2 °C), temperature source Temporal Artery , resp. rate 17, height 160 cm (63\"), weight 62.6 kg (138 lb), SpO2 95 %, not currently " breastfeeding.    Physical Exam:  General Appearance:  Well-developed middle-aged woman, resting post pain medication    Head:  Normocephalic, atraumatic    Eyes:          Pupils small, equal    Ears:     Throat: Oral mucosa dry    Neck: Trachea midline, no JVD    Back:   Epidural in place    Lungs:   Right thoracotomy dressing intact. Right chest tube with a small air leak. Bilateral mid and end expiratory rhonchi     Heart:  Regular, normal S1, S2, no murmur    Abdomen:   Bowel sounds present, nondistended, soft    Rectal:   Deferred   Extremities: No pitting edema or clubbing    Pulses: Pulses are palpable but thready    Skin: Warm and dry    Lymph nodes:    Neurologic: Sedated post pain medication       Results Review:     I reviewed the patient's new clinical results.     Results from last 7 days  Lab Units 02/01/18  1322   SODIUM mmol/L 140   POTASSIUM mmol/L 4.1   CHLORIDE mmol/L 102   CO2 mmol/L 30.0   BUN mg/dL 9   CREATININE mg/dL 0.80   CALCIUM mg/dL 9.7   BILIRUBIN mg/dL 1.1   ALK PHOS U/L 97   ALT (SGPT) U/L 40   AST (SGOT) U/L 49*   GLUCOSE mg/dL 87       Results from last 7 days  Lab Units 02/01/18  1322   WBC 10*3/mm3 5.05   HEMOGLOBIN g/dL 15.8*   HEMATOCRIT % 44.4*   PLATELETS 10*3/mm3 182         No results found for: BLOODCX  No results found for: URINECX    Preoperative pulmonary function testing, FVC 2.98 L, 99%, FEV1 2.20 L, 95%, ratio 74%, total lung capacity 4.73 L, 106%, diffusion capacity 15.1, 80%, normal study    I reviewed the patient's new imaging including images and reports.       FINDINGS: Cardiac size within normal limits. Scattered ill-defined  opacifications within the right upper lobe may represent infiltrates  from recent bronchoscopy. Right chest tube in place without discrete  pneumothorax identified. Minimal left basilar atelectasis.          IMPRESSION:  Ill-defined opacifications right upper lobe which may  represent infiltrates from recent instrumentation or worsening  airspace  disease. Right chest tube in place without discrete pneumothorax  identified.      D:  02/07/2018  E:  02/07/2018    All medications reviewed.     Pharmacy Consult  Does not apply Daily   buPROPion  mg Oral QAM   ceFAZolin 2 g Intravenous Q8H   desvenlafaxine 50 mg Oral Daily   [START ON 2/8/2018] heparin (porcine) 5,000 Units Subcutaneous Q12H   ipratropium-albuterol 3 mL Nebulization 4x Daily - RT         Assessment/Plan     Principal Problem:    Mass of upper lobe of right lung  Active Problems:    Lung nodule    62-year-old woman, nonsmoker, who presented with a chronic cough and was found to have an 8 x 10 mm spiculated right upper lobe nodule. Today she underwent resection. She is now resting after pain medication administration. She is oxygenating well on 2 L nasal cannula. Chest x-ray does not reveal a pneumothorax.    PLAN:  Monitor rhythm, pulse oximetry, urine output  Epidural pain management  Start nebulized bronchodilators  Incentive spirometry  Restart Wellbutrin and Pristiq, home medications  Replace electrolytes as needed    VTE Prophylaxis:SCDS    Stress Ulcer Prophylaxis:none    Ange Perez MD  02/07/18  12:00 PM      Time: 25min  .

## 2018-02-07 NOTE — ANESTHESIA PROCEDURE NOTES
Thoracic Epidural Block    Patient location during procedure: OR  Start Time: 2/7/2018 7:31 AM  Indication:at surgeon's request and post-op pain management  Performed By  Anesthesiologist: BRIAN TURNER  Preanesthetic Checklist  Completed: patient identified, site marked, surgical consent, pre-op evaluation, timeout performed, IV checked, risks and benefits discussed and monitors and equipment checked  Prep:  Pt Position:sitting  Sterile Tech:cap, gloves, mask and sterile barrier  Prep:chlorhexidine gluconate and isopropyl alcohol  Monitoring:blood pressure monitoring, continuous pulse oximetry and EKG  Epidural Block Procedure:  Approach:midline  Guidance:landmark technique and palpation technique  Location:thoracic  Level:5-6  Needle Type:Tuohy  Needle Gauge:18 G  Cath Size: 20 G  Loss of Resistance Medium: air  Cath Depth at skin:13 cm  Paresthesia: none  Aspiration:negative  Test Dose:negative  Post Assessment:  Dressing:biopatch applied, occlusive dressing applied and secured with tape  Pt Tolerance:patient tolerated the procedure well with no apparent complications  Complications:no

## 2018-02-07 NOTE — INTERVAL H&P NOTE
"Pre-Op H&P (See Recent Office Note Attached for Full H&P)    Chief complaint: Lung nodule    Review of Systems:  General ROS:  no fever, chills, rashes, No change since last office visit  Cardiovascular ROS: no chest pain or dyspnea on exertion  Respiratory ROS: +Persistant cough, no shortness of breath, or wheezing    Immunization History:   Influenza:  Yes 2017  Pneumococcal:  Yes 2017  Tetanus:  no    Meds:    No current facility-administered medications on file prior to encounter.      Current Outpatient Prescriptions on File Prior to Encounter   Medication Sig Dispense Refill   • buPROPion XL (WELLBUTRIN XL) 300 MG 24 hr tablet Take 1 tablet by mouth Every Morning.  0   • desvenlafaxine (PRISTIQ) 50 MG 24 hr tablet Take 50 mg by mouth Daily.  0   • hydrOXYzine (VISTARIL) 25 MG capsule take 1 capsule by mouth twice a day if needed for anxiety  0   • ibuprofen (ADVIL,MOTRIN) 200 MG tablet Take 400 mg by mouth Every 6 (Six) Hours As Needed for Mild Pain .         Vital Signs:  /76 (BP Location: Right arm, Patient Position: Sitting)  Pulse 85  Temp 98.6 °F (37 °C) (Temporal Artery )   Resp 18  Ht 160 cm (63\")  Wt 62.6 kg (138 lb)  SpO2 94%  Breastfeeding? No  BMI 24.45 kg/m2    Physical Exam:    CV:  S1S2 regular rate and rhythm, no murmur               Resp:  Clear to auscultation; respirations regular, even and unlabored    Results Review:    I reviewed the patient's new clinical results.    Cancer Staging (if applicable)  Cancer Patient: __ yes _x_no __unknown; If yes, clinical stage T:__ N:__M:__, stage group or __N/A    Assessment/Plan:    RUL spiculated lesion (probable carcinoma) - Video assisted thoracotomy and biopsy with possible RU lobectomy    Lucila Higginbotham, APRN  2/7/2018   6:32 AM    As above.I have reviewed, verified, and confirmed the above history and current status.  I have examined the patient and confirmed the above physical findings.    Mir Lopez MD  CTSurgery  02/07/18 "   7:35 AM

## 2018-02-07 NOTE — ANESTHESIA PROCEDURE NOTES
Airway  Urgency: elective    Airway not difficult    General Information and Staff    Patient location during procedure: OR  CRNA: SUJATA KNUTSON    Indications and Patient Condition  Indications for airway management: airway protection    Preoxygenated: yes  MILS maintained throughout  Mask difficulty assessment: 2 - vent by mask + OA or adjuvant +/- NMBA    Final Airway Details  Final airway type: endotracheal airway      Successful airway: ETT  Cuffed: yes   Successful intubation technique: direct laryngoscopy  Endotracheal tube insertion site: oral  Blade: Restrepo  Blade size: #2  ETT size: 7.5 mm  Cormack-Lehane Classification: grade I - full view of glottis  Placement verified by: chest auscultation and capnometry   Cuff volume (mL): 5  Measured from: lips  ETT to lips (cm): 20  Number of attempts at approach: 1    Additional Comments  Pt to OR 16. Pt moved self to OR table. After Epidural placed and secured, pt laid down on OR table, ASA monitors placed. Pre-O2 with 100% Oxygen. SIVI. Atraumatic intubation.

## 2018-02-07 NOTE — ANESTHESIA POSTPROCEDURE EVALUATION
Patient: Janna HUERTA Stone    Procedure Summary     Date Anesthesia Start Anesthesia Stop Room / Location    02/07/18 0726 [facility] OR 16 / [facility]       Procedure Diagnosis Surgeon Provider    BRONCHOSCOPY, Video Assisted Thoracoscopy right side and open Thoracotomy with Right upper lobe wedge resection (Right Chest) Mass of upper lobe of right lung  (Mass of upper lobe of right lung [R91.8]) MD Reji Cruz MD          Anesthesia Type: general  Last vitals  BP   93/46 (02/07/18 0940)   Temp   97.1 °F (36.2 °C) (02/07/18 0940)   Pulse   67 (02/07/18 0940)   Resp   16 (02/07/18 0940)     SpO2   99 % (02/07/18 0940)     Post Anesthesia Care and Evaluation    Patient location during evaluation: PACU  Patient participation: complete - patient cannot participate  Level of consciousness: responsive to physical stimuli  Pain score: 0  Pain management: adequate  Airway patency: patent  Anesthetic complications: No anesthetic complications    Cardiovascular status: stable  Respiratory status: acceptable, nasal cannula, oral airway and spontaneous ventilation  Hydration status: stable    Comments: Pt transferred to PACU with O2. Vital signs stable. Report to PACU RN and care accepted.

## 2018-02-07 NOTE — OP NOTE
Operative Report      Janna Muñiz    : 1955  MRN: [id number]  [facility]: [id number]      Date:18      Preop Diagnosis: Pulmonary nodule right upper lobe (10 mm).      Postop Diagnosis: Pulmonary nodule right upper lobe (10 mm).  Benign pulmonary nodule (operative).      Procedure:   #1 bronchoscopy  #2 right VATS with biopsy of right upper lobe  #3 right thoracotomy with wedge biopsy right upper lobe      Surgeon: Mir Lopze MD      Assistant: Dakota Oneil PA-C      Indications: 62-year-old  female (lifelong nonsmoker) presents with a pulmonary nodule (8-10 millimeters) of the right upper lobe.  After thorough evaluation patient was given the option of wedge resection versus observation.  Discussed procedure in detail and also discussed the option of observation the patient understood the procedure she understood risks and benefits of the procedure and wished to proceed.      Operative Findings: The lesion in question was difficult to identify.  The wedge resection carried out with the VATS procedure did not contain the nodule.  Open thoracotomy was necessary for appropriate palpation of the lung and wedge biopsy of the lesion.  Frozen section was read as benign pulmonary nodule.      EBL: 20 cc      Operative Narrative: The patient was brought to the operating room and prepared for surgical intervention in the usual manner.  A timeout was called the patient's identity, the proposed procedure, and the availability of appropriate equipment was verified.Following satisfactory inhalation anesthesia the fiber-optic bronchoscope was introduced via the endotracheal tube.  The trachea was normal the tavon was sharp in the midline the left mainstem, left upper lobe, lingula, and left lower lobe with all respective segmental takeoffs was inspected and noted to be normal.  The scope was pulled back to the tavon.  The right mainstem, bronchus intermedius, right upper lobe, right middle lobe, and  right lower lobe with all respective segmental takeoffs was inspected and noted to be normal.  Washings were obtained and sent for routine culture.  The scope was withdrawn. Anesthesia changed the endotracheal tube to a double-lumen tube.  The patient was positioned in the left lateral decubitus position the right chest was prepped and draped in the usual manner.  A one inch incision was made over the eighth rib at the mid axillary line, a second incision at the fifth interspace in the anterior axillary line and a third incision at the sixth interspace posterior axillary line.  The right hemithorax was inspected and noted to be normal.  There were some adhesions of the upper lobe to the parietal pleura these were sharply divided with cautery.  The area under the adhesions was grasped and elevated up to the wound where an index finger could be inserted and it felt like there was a nodule in this region.  Utilizing 3 loads of the BRODIE 60 Endo stapler the area was wedged out.  Once out it was apparent that there were no nodular regions in the this portion of the lung.  Further inspection of the upper lobe was carried out but the nodule was still not apparent.  The anterior incision was then enlarged, a standard posterior lateral thoracotomy.  The fifth intercostal space was entered.  Palpation of the upper lobe was carried out.  The area of palpable abnormality in the upper lobe was wedged out with a BRODIE stapler.  Frozen section was read as benign scar tissue.  Inspection revealed hemostasis to be adequate.  2 #32 [place] chest tubes were inserted through separate stab wounds.  The chest was closed with #2 pericostal sutures, 0 Vicryl in the muscle and fascia, and 4-0 Monocryl.          Mir Lopez MD  CTSurgery  02/07/18   3:05 PM

## 2018-02-07 NOTE — PLAN OF CARE
Problem: Perioperative Period (Adult)  Goal: Signs and Symptoms of Listed Potential Problems Will be Absent or Manageable (Perioperative Period)  Outcome: Ongoing (interventions implemented as appropriate)   02/07/18 0617   Perioperative Period   Problems Assessed (Perioperative Period) all   Problems Present (Perioperative Period) none

## 2018-02-08 ENCOUNTER — APPOINTMENT (OUTPATIENT)
Dept: GENERAL RADIOLOGY | Facility: HOSPITAL | Age: 63
End: 2018-02-08

## 2018-02-08 LAB
ANION GAP SERPL CALCULATED.3IONS-SCNC: 8 MMOL/L (ref 3–11)
BASOPHILS # BLD AUTO: 0.02 10*3/MM3 (ref 0–0.2)
BASOPHILS NFR BLD AUTO: 0.2 % (ref 0–1)
BUN BLD-MCNC: 13 MG/DL (ref 9–23)
BUN/CREAT SERPL: 16.3 (ref 7–25)
CALCIUM SPEC-SCNC: 8.7 MG/DL (ref 8.7–10.4)
CHLORIDE SERPL-SCNC: 100 MMOL/L (ref 99–109)
CO2 SERPL-SCNC: 26 MMOL/L (ref 20–31)
CREAT BLD-MCNC: 0.8 MG/DL (ref 0.6–1.3)
DEPRECATED RDW RBC AUTO: 41.7 FL (ref 37–54)
EOSINOPHIL # BLD AUTO: 0.01 10*3/MM3 (ref 0–0.3)
EOSINOPHIL NFR BLD AUTO: 0.1 % (ref 0–3)
ERYTHROCYTE [DISTWIDTH] IN BLOOD BY AUTOMATED COUNT: 12.3 % (ref 11.3–14.5)
GFR SERPL CREATININE-BSD FRML MDRD: 73 ML/MIN/1.73
GLUCOSE BLD-MCNC: 121 MG/DL (ref 70–100)
HCT VFR BLD AUTO: 37.3 % (ref 34.5–44)
HGB BLD-MCNC: 12.8 G/DL (ref 11.5–15.5)
IMM GRANULOCYTES # BLD: 0.02 10*3/MM3 (ref 0–0.03)
IMM GRANULOCYTES NFR BLD: 0.2 % (ref 0–0.6)
LYMPHOCYTES # BLD AUTO: 1.17 10*3/MM3 (ref 0.6–4.8)
LYMPHOCYTES NFR BLD AUTO: 13.6 % (ref 24–44)
MCH RBC QN AUTO: 32 PG (ref 27–31)
MCHC RBC AUTO-ENTMCNC: 34.3 G/DL (ref 32–36)
MCV RBC AUTO: 93.3 FL (ref 80–99)
MONOCYTES # BLD AUTO: 0.81 10*3/MM3 (ref 0–1)
MONOCYTES NFR BLD AUTO: 9.4 % (ref 0–12)
NEUTROPHILS # BLD AUTO: 6.6 10*3/MM3 (ref 1.5–8.3)
NEUTROPHILS NFR BLD AUTO: 76.5 % (ref 41–71)
PLATELET # BLD AUTO: 166 10*3/MM3 (ref 150–450)
PMV BLD AUTO: 9.5 FL (ref 6–12)
POTASSIUM BLD-SCNC: 3.9 MMOL/L (ref 3.5–5.5)
RBC # BLD AUTO: 4 10*6/MM3 (ref 3.89–5.14)
SODIUM BLD-SCNC: 134 MMOL/L (ref 132–146)
WBC NRBC COR # BLD: 8.63 10*3/MM3 (ref 3.5–10.8)

## 2018-02-08 PROCEDURE — 25010000002 HYDROMORPHONE PER 4 MG: Performed by: INTERNAL MEDICINE

## 2018-02-08 PROCEDURE — 25010000002 KETOROLAC TROMETHAMINE PER 15 MG: Performed by: INTERNAL MEDICINE

## 2018-02-08 PROCEDURE — 25010000002 HYDROMORPHONE PER 4 MG: Performed by: NURSE ANESTHETIST, CERTIFIED REGISTERED

## 2018-02-08 PROCEDURE — 94799 UNLISTED PULMONARY SVC/PX: CPT

## 2018-02-08 PROCEDURE — 80048 BASIC METABOLIC PNL TOTAL CA: CPT | Performed by: PHYSICIAN ASSISTANT

## 2018-02-08 PROCEDURE — 99024 POSTOP FOLLOW-UP VISIT: CPT | Performed by: THORACIC SURGERY (CARDIOTHORACIC VASCULAR SURGERY)

## 2018-02-08 PROCEDURE — 25010000002 PROMETHAZINE PER 50 MG: Performed by: NURSE ANESTHETIST, CERTIFIED REGISTERED

## 2018-02-08 PROCEDURE — 94760 N-INVAS EAR/PLS OXIMETRY 1: CPT

## 2018-02-08 PROCEDURE — 25010000002 HEPARIN (PORCINE) PER 1000 UNITS: Performed by: PHYSICIAN ASSISTANT

## 2018-02-08 PROCEDURE — 99232 SBSQ HOSP IP/OBS MODERATE 35: CPT | Performed by: INTERNAL MEDICINE

## 2018-02-08 PROCEDURE — 85025 COMPLETE CBC W/AUTO DIFF WBC: CPT | Performed by: PHYSICIAN ASSISTANT

## 2018-02-08 PROCEDURE — 99024 POSTOP FOLLOW-UP VISIT: CPT | Performed by: PHYSICIAN ASSISTANT

## 2018-02-08 PROCEDURE — 25010000002 MORPHINE SULFATE (PF) 2 MG/ML SOLUTION: Performed by: THORACIC SURGERY (CARDIOTHORACIC VASCULAR SURGERY)

## 2018-02-08 PROCEDURE — 71045 X-RAY EXAM CHEST 1 VIEW: CPT

## 2018-02-08 PROCEDURE — 94640 AIRWAY INHALATION TREATMENT: CPT

## 2018-02-08 RX ORDER — ACETAMINOPHEN 325 MG/1
650 TABLET ORAL EVERY 6 HOURS PRN
Status: DISCONTINUED | OUTPATIENT
Start: 2018-02-08 | End: 2018-02-14 | Stop reason: HOSPADM

## 2018-02-08 RX ORDER — KETOROLAC TROMETHAMINE 30 MG/ML
30 INJECTION, SOLUTION INTRAMUSCULAR; INTRAVENOUS EVERY 6 HOURS PRN
Status: DISPENSED | OUTPATIENT
Start: 2018-02-08 | End: 2018-02-13

## 2018-02-08 RX ORDER — HYDROCODONE BITARTRATE AND ACETAMINOPHEN 7.5; 325 MG/1; MG/1
1 TABLET ORAL EVERY 4 HOURS PRN
Qty: 30 TABLET | Refills: 0 | Status: SHIPPED | OUTPATIENT
Start: 2018-02-08 | End: 2018-02-17

## 2018-02-08 RX ORDER — HYDROMORPHONE HYDROCHLORIDE 1 MG/ML
0.5 INJECTION, SOLUTION INTRAMUSCULAR; INTRAVENOUS; SUBCUTANEOUS
Status: DISPENSED | OUTPATIENT
Start: 2018-02-08 | End: 2018-02-11

## 2018-02-08 RX ADMIN — KETOROLAC TROMETHAMINE 30 MG: 30 INJECTION, SOLUTION INTRAMUSCULAR at 09:34

## 2018-02-08 RX ADMIN — HYDROMORPHONE HYDROCHLORIDE 0.25 MG: 10 INJECTION, SOLUTION INTRAMUSCULAR; INTRAVENOUS; SUBCUTANEOUS at 04:26

## 2018-02-08 RX ADMIN — IPRATROPIUM BROMIDE AND ALBUTEROL SULFATE 3 ML: .5; 3 SOLUTION RESPIRATORY (INHALATION) at 11:26

## 2018-02-08 RX ADMIN — DESVENLAFAXINE SUCCINATE 50 MG: 50 TABLET, EXTENDED RELEASE ORAL at 08:26

## 2018-02-08 RX ADMIN — HEPARIN SODIUM 5000 UNITS: 5000 INJECTION, SOLUTION INTRAVENOUS; SUBCUTANEOUS at 21:51

## 2018-02-08 RX ADMIN — PROMETHAZINE HYDROCHLORIDE 6.25 MG: 25 INJECTION INTRAMUSCULAR; INTRAVENOUS at 09:40

## 2018-02-08 RX ADMIN — HEPARIN SODIUM 5000 UNITS: 5000 INJECTION, SOLUTION INTRAVENOUS; SUBCUTANEOUS at 08:26

## 2018-02-08 RX ADMIN — BUPROPION HYDROCHLORIDE 300 MG: 150 TABLET, FILM COATED, EXTENDED RELEASE ORAL at 06:13

## 2018-02-08 RX ADMIN — MORPHINE SULFATE 4 MG: 10 INJECTION INTRAVENOUS at 07:46

## 2018-02-08 RX ADMIN — IPRATROPIUM BROMIDE AND ALBUTEROL SULFATE 3 ML: .5; 3 SOLUTION RESPIRATORY (INHALATION) at 07:19

## 2018-02-08 RX ADMIN — HYDROMORPHONE HYDROCHLORIDE 0.5 MG: 10 INJECTION, SOLUTION INTRAMUSCULAR; INTRAVENOUS; SUBCUTANEOUS at 14:32

## 2018-02-08 RX ADMIN — HYDROMORPHONE HYDROCHLORIDE 0.25 MG: 10 INJECTION, SOLUTION INTRAMUSCULAR; INTRAVENOUS; SUBCUTANEOUS at 01:17

## 2018-02-08 RX ADMIN — HYDROMORPHONE HYDROCHLORIDE 0.5 MG: 10 INJECTION, SOLUTION INTRAMUSCULAR; INTRAVENOUS; SUBCUTANEOUS at 21:51

## 2018-02-08 RX ADMIN — HYDROMORPHONE HYDROCHLORIDE 0.5 MG: 10 INJECTION, SOLUTION INTRAMUSCULAR; INTRAVENOUS; SUBCUTANEOUS at 09:34

## 2018-02-08 RX ADMIN — SODIUM CHLORIDE 250 ML: 9 INJECTION, SOLUTION INTRAVENOUS at 23:22

## 2018-02-08 RX ADMIN — IPRATROPIUM BROMIDE AND ALBUTEROL SULFATE 3 ML: .5; 3 SOLUTION RESPIRATORY (INHALATION) at 20:07

## 2018-02-08 RX ADMIN — IPRATROPIUM BROMIDE AND ALBUTEROL SULFATE 3 ML: .5; 3 SOLUTION RESPIRATORY (INHALATION) at 15:23

## 2018-02-08 NOTE — PROGRESS NOTES
"Critical Care Note     LOS: 1 day   Patient Care Team:  Navjot Harris MD as PCP - General (Family Medicine)    Chief Complaint/Reason for visit:  Lung nodule, postoperative management of electrolytes and glycemic control      Subjective      62-year-old woman nonsmoker evaluated in our office for chronic cough. CT scan revealed an 8 x 10 mm spiculated nodule in the right upper lobe posteriorly.PET scan was negative but that is inconclusive and a nodule this size.  2/7/18 she underwent right thoracotomy. Frozen PATH non malignant.    Interval History:   She has an epidural but still has significant incisional pain. She is afraid of oral narcotics because they cause of severe vomiting. She does not like to take pain medication. 0.25 mg of hydromorphone is an adequate. She does not take chronic narcotics at home. Appetite is poor but she has not vomiting currently.    Review of Systems:    All systems were reviewed and negative except as noted in subjective.    Medical history, surgical history, social history, family history reviewed  Anxiety and depression, frequent urinary tract infections, fibromyalgia, osteoarthritis  Cholecystectomy, hysterectomy   Multiple drug allergies, iodine, Percocet, sulfa, Paxil, Darvon  Single, 3 children, currently living with her granddaughter  Objective     Intake/Output:    Intake/Output Summary (Last 24 hours) at 02/08/18 1035  Last data filed at 02/08/18 1000   Gross per 24 hour   Intake           1390.8 ml   Output             1115 ml   Net            275.8 ml       Nutrition:  Diet Regular    Infusions:    Morphine and bupivacaine epidural  Last Rate: 3 mL/hr at 02/07/18 1047       Telemetry:  Sinus Rhythm: normal sinus rhythm          Vital Signs  Blood pressure 103/61, pulse 110, temperature 99.3 °F (37.4 °C), resp. rate 12, height 160 cm (63\"), weight 62.6 kg (138 lb), SpO2 93 %,    Physical Exam:  General Appearance:  Well-developed middle-aged woman, Appears uncomfortable  "   Head:  Normocephalic, atraumatic    Eyes:          Pupils small, equal    Ears:     Throat: Oral mucosa dry    Neck: Trachea midline, no JVD    Back:   Epidural in place    Lungs:   Right thoracotomy dressing intact. Right chest tube with a small air leak. Bilateral mid and end expiratory rhonchi     Heart:  Regular, normal S1, S2, no murmur    Abdomen:   Bowel sounds present, nondistended, soft    Rectal:   Deferred   Extremities: No pitting edema or clubbing    Pulses: Pulses are palpable but thready    Skin: Warm and dry    Lymph nodes:    Neurologic: Sedated post pain medication       Results Review:     I reviewed the patient's new clinical results.     Results from last 7 days  Lab Units 02/08/18  0704 02/01/18  1322   SODIUM mmol/L 134 140   POTASSIUM mmol/L 3.9 4.1   CHLORIDE mmol/L 100 102   CO2 mmol/L 26.0 30.0   BUN mg/dL 13 9   CREATININE mg/dL 0.80 0.80   CALCIUM mg/dL 8.7 9.7   BILIRUBIN mg/dL  --  1.1   ALK PHOS U/L  --  97   ALT (SGPT) U/L  --  40   AST (SGOT) U/L  --  49*   GLUCOSE mg/dL 121* 87       Results from last 7 days  Lab Units 02/08/18  0704 02/01/18  1322   WBC 10*3/mm3 8.63 5.05   HEMOGLOBIN g/dL 12.8 15.8*   HEMATOCRIT % 37.3 44.4*   PLATELETS 10*3/mm3 166 182         No results found for: BLOODCX  No results found for: URINECX    Preoperative pulmonary function testing, FVC 2.98 L, 99%, FEV1 2.20 L, 95%, ratio 74%, total lung capacity 4.73 L, 106%, diffusion capacity 15.1, 80%, normal study    I reviewed the patient's new imaging including images and reports.     FINDINGS:   1. Chest tubes in the right hemithorax well positioned. Detectable  pneumothorax on the right is not identified.  2. There is mild hazy opacity in the right upper lobe, improved from  yesterday. Right lung base has cleared. There is volume loss in the  right chest and there is air in the subcutis tissues around the right  lower chest indicating a mild pleural leak. This is new and should be  noted.  3. The left  lung is clear with the exception of some mild vertical  atelectasis. The left lung has improved considerably from yesterday.          IMPRESSION:  From yesterday's images, the right upper lobe density is  less prominent and somewhat improved. The left lung base has nearly  completely cleared. There is new subcutaneous emphysema around the right  lower chest indicating a likely pleural leak, however, a pulmonary  embolus is not currently identified on the right.      D:  02/08/2018    All medications reviewed.     Pharmacy Consult  Does not apply Daily   buPROPion  mg Oral QAM   desvenlafaxine 50 mg Oral Daily   heparin (porcine) 5,000 Units Subcutaneous Q12H   ipratropium-albuterol 3 mL Nebulization 4x Daily - RT         Assessment/Plan     Principal Problem:    Mass of upper lobe of right lung  Active Problems:    Lung nodule    62-year-old woman, nonsmoker, who presented with a chronic cough and was found to have an 8 x 10 mm spiculated right upper lobe nodule. 2/7/18 She underwent resection. Frozen pathology was nonmalignant. She is oxygenating on 6 L nasal cannula. Chest x-ray does not reveal a pneumothorax. She is having difficulty with postoperative pain management. Oral narcotics cause severe nausea and vomiting.    PLAN:  Monitor rhythm, pulse oximetry, urine output  Epidural pain management  Add Toradol  Increase hydromorphone 0.5 mg   nebulized bronchodilators  Incentive spirometry  Restart Wellbutrin and Pristiq, home medications  Replace electrolytes as needed  Up with physical therapy  Telemetry    VTE Prophylaxis:SCDS    Stress Ulcer Prophylaxis:none    Ange Perez MD  02/08/18  10:35 AM      Time: 25min  .

## 2018-02-08 NOTE — PLAN OF CARE
Problem: Patient Care Overview (Adult)  Goal: Plan of Care Review  Outcome: Ongoing (interventions implemented as appropriate)   02/08/18 0301   Coping/Psychosocial Response Interventions   Plan Of Care Reviewed With patient   Patient Care Overview   Progress progress toward functional goals as expected   Outcome Evaluation   Outcome Summary/Follow up Plan Patient's pain has been managed with IV medications. Patient remains A&O throughout the night, on 6LNC, VS have remained stable.Plueral tube dressing has been marked and reinforced due to drainage around the tube. Will continue to monitor patient throughout the night.        Problem: Perioperative Period (Adult)  Goal: Signs and Symptoms of Listed Potential Problems Will be Absent or Manageable (Perioperative Period)  Outcome: Ongoing (interventions implemented as appropriate)   02/07/18 2058 02/08/18 0301   Perioperative Period   Problems Assessed (Perioperative Period) --  all   Problems Present (Perioperative Period) situational response;pain --        Problem: Pressure Ulcer Risk (Deo Scale) (Adult,Obstetrics,Pediatric)  Goal: Identify Related Risk Factors and Signs and Symptoms  Outcome: Ongoing (interventions implemented as appropriate)   02/07/18 2058   Pressure Ulcer Risk (Deo Scale)   Related Risk Factors (Pressure Ulcer Risk (Deo Scale)) critical care admission;mobility impaired     Goal: Skin Integrity  Outcome: Ongoing (interventions implemented as appropriate)   02/08/18 0301   Pressure Ulcer Risk (Deo Scale) (Adult,Obstetrics,Pediatric)   Skin Integrity making progress toward outcome       Problem: Lung Surgery (via Thoracotomy) (Adult)  Goal: Signs and Symptoms of Listed Potential Problems Will be Absent or Manageable (Lung Surgery)  Outcome: Ongoing (interventions implemented as appropriate)   02/08/18 0301   Lung Surgery (via Thoracotomy)   Problems Assessed (Lung Surgery) all   Problems Present (Lung Surgery) pain;situational response

## 2018-02-08 NOTE — PROGRESS NOTES
[facility]    Acute pain service Inpatient Progress Note    Patient Name: Janna Muñiz  :  1955  MRN:  [id number]        Acute Pain  Service Inpatient Progress Note:    Analgesia:Fair  Pain Score:9/10  LOC: alert and awake  Resp Status: room air  Cardiac: VS stable  Side Effects:None  Catheter Site:clean, dressing intact and dry  Cath type: peripheral nerve cath(MOOG pump)  Infusion rate: 12ml/hr (3)  Catheter Plan:Catheter to remain Insitu, Continue catheter infusion rate unchanged and Infusion rate changed to and 4ml/hr

## 2018-02-08 NOTE — PLAN OF CARE
Problem: Patient Care Overview (Adult)  Goal: Plan of Care Review  Outcome: Ongoing (interventions implemented as appropriate)   02/07/18 2058   Coping/Psychosocial Response Interventions   Plan Of Care Reviewed With patient;family   Patient Care Overview   Progress improving   Outcome Evaluation   Outcome Summary/Follow up Plan Pt arrived at 1132; Pt in pain, given IV meds. Pt only has IV fluids at KVO. Pt encouraged to DB&C, encouraged to use IS. VSS.        Problem: Perioperative Period (Adult)  Goal: Signs and Symptoms of Listed Potential Problems Will be Absent or Manageable (Perioperative Period)  Outcome: Ongoing (interventions implemented as appropriate)      Problem: Pressure Ulcer Risk (Deo Scale) (Adult,Obstetrics,Pediatric)  Goal: Identify Related Risk Factors and Signs and Symptoms  Outcome: Ongoing (interventions implemented as appropriate)   02/07/18 2058   Pressure Ulcer Risk (Deo Scale)   Related Risk Factors (Pressure Ulcer Risk (Deo Scale)) critical care admission;mobility impaired     Goal: Skin Integrity  Outcome: Ongoing (interventions implemented as appropriate)   02/07/18 2058   Pressure Ulcer Risk (Deo Scale) (Adult,Obstetrics,Pediatric)   Skin Integrity making progress toward outcome       Problem: Lung Surgery (via Thoracotomy) (Adult)  Goal: Signs and Symptoms of Listed Potential Problems Will be Absent or Manageable (Lung Surgery)  Outcome: Ongoing (interventions implemented as appropriate)   02/07/18 2058   Lung Surgery (via Thoracotomy)   Problems Assessed (Lung Surgery) all   Problems Present (Lung Surgery) pain;situational response

## 2018-02-08 NOTE — PROGRESS NOTES
CTS Progress Note      POD 1 s/p R thor. RUL wedgeX3       LOS: 1 day   Patient Care Team:  Navjot Harris MD as PCP - General (Family Medicine)    Subjective  Breathing ok  Glad it wasn't cancer    CC: sore    Objective    Vital Signs  Temp:  [97.1 °F (36.2 °C)-99.1 °F (37.3 °C)] 99.1 °F (37.3 °C)  Heart Rate:  [] 93  Resp:  [15-20] 20  BP: ()/(46-74) 116/67    Physical Exam:   General Appearance: alert, appears stated age and cooperative   Lungs: clear to auscultation, respirations regular, respirations even and respirations unlabored   Heart: regular rhythm & normal rate, normal S1, S2, no murmur, no bora, no rub and no click   Skin:  Incision c/d/i     Results     Results from last 7 days  Lab Units 02/01/18  1322   WBC 10*3/mm3 5.05   HEMOGLOBIN g/dL 15.8*   HEMATOCRIT % 44.4*   PLATELETS 10*3/mm3 182       Results from last 7 days  Lab Units 02/01/18  1322   SODIUM mmol/L 140   POTASSIUM mmol/L 4.1   CHLORIDE mmol/L 102   CO2 mmol/L 30.0   BUN mg/dL 9   CREATININE mg/dL 0.80   GLUCOSE mg/dL 87   CALCIUM mg/dL 9.7           Imaging Results (last 24 hours)     Procedure Component Value Units Date/Time    XR Chest 1 View [[id number]] Collected:  02/07/18 1015     Updated:  02/07/18 1115    Narrative:       EXAMINATION: XR CHEST 1 VW-02/07/2018:      INDICATION: Postop; R91.1-Solitary pulmonary nodule; R91.8-Other  nonspecific abnormal finding of lung field.      COMPARISON: Chest x-ray 02/01/2018.     FINDINGS: Cardiac size within normal limits. Scattered ill-defined  opacifications within the right upper lobe may represent infiltrates  from recent bronchoscopy. Right chest tube in place without discrete  pneumothorax identified. Minimal left basilar atelectasis.           Impression:       Ill-defined opacifications right upper lobe which may  represent infiltrates from recent instrumentation or worsening airspace  disease. Right chest tube in place without discrete pneumothorax  identified.     D:   02/07/2018  E:  02/07/2018     This report was finalized on 2/7/2018 11:13 AM by Dr. Tarik Tavares.       XR Chest 1 View [[id number]] Updated:  02/08/18 0437          Assessment    Principal Problem:    Mass of upper lobe of right lung  Active Problems:    Lung nodule  await per. path  CTs 335cc out last 18 hours    Plan   To tele  CTs out in am  ambulate    ANASTASIA Richardson  02/08/18  6:35 AM    As above stable.  Chest tube removal in a.m.  Telemetry today.I have reviewed, verified, and confirmed the above history and current status.  I have examined the patient and confirmed the above physical findings.    Mir Lopez MD  CTSurgery  02/08/18   2:43 PM

## 2018-02-08 NOTE — PROGRESS NOTES
Continued Stay Note  ASHLY Smith     Patient Name: Janna Muñiz  MRN: [id number]  Today's Date: 2/8/2018    Admit Date: 2/7/2018          Discharge Plan       02/08/18 1148    Case Management/Social Work Plan    Plan Home     Patient/Family In Agreement With Plan yes    Additional Comments Spoke with patient and patient's cousin at bedside. Patient lives in a second floor apt[facility] Patient is taking care of her 16 year old granddaughter. Patient plans to stay with her dtr [place] until she can manage the stairs to get to her apt. Currently, patient has  no DME in the home.  CM will follow along and assist with discharge needs if necessary.  Hallie @ [phone]               Discharge Codes     None            Jeny Recinos RN

## 2018-02-08 NOTE — PROGRESS NOTES
Multidisciplinary Rounds    Time: 20min  Patient Name: Janna Muñiz  Date of Encounter: 02/08/18 9:02 AM  MRN: [id number]  Admission date: 2/7/2018      Reason for visit: MDR. RD to continue to follow per protocol.     Additional information obtained during MDR: Pt with orders to be transferred to the floor.     Current diet: Diet Regular      Intervention:  Follow treatment plan  Care plan reviewed    Follow up:   Per protocol      Roxann Trimble MS RD/MAURILIO CNSC  9:02 AM

## 2018-02-09 ENCOUNTER — APPOINTMENT (OUTPATIENT)
Dept: GENERAL RADIOLOGY | Facility: HOSPITAL | Age: 63
End: 2018-02-09

## 2018-02-09 PROBLEM — Z98.890 S/P THORACOTOMY: Status: ACTIVE | Noted: 2018-02-09

## 2018-02-09 LAB
ANION GAP SERPL CALCULATED.3IONS-SCNC: 3 MMOL/L (ref 3–11)
BUN BLD-MCNC: 19 MG/DL (ref 9–23)
BUN/CREAT SERPL: 19 (ref 7–25)
CALCIUM SPEC-SCNC: 9.2 MG/DL (ref 8.7–10.4)
CHLORIDE SERPL-SCNC: 102 MMOL/L (ref 99–109)
CO2 SERPL-SCNC: 30 MMOL/L (ref 20–31)
CREAT BLD-MCNC: 1 MG/DL (ref 0.6–1.3)
GFR SERPL CREATININE-BSD FRML MDRD: 56 ML/MIN/1.73
GLUCOSE BLD-MCNC: 110 MG/DL (ref 70–100)
POTASSIUM BLD-SCNC: 5 MMOL/L (ref 3.5–5.5)
SODIUM BLD-SCNC: 135 MMOL/L (ref 132–146)

## 2018-02-09 PROCEDURE — 71045 X-RAY EXAM CHEST 1 VIEW: CPT

## 2018-02-09 PROCEDURE — 99024 POSTOP FOLLOW-UP VISIT: CPT | Performed by: THORACIC SURGERY (CARDIOTHORACIC VASCULAR SURGERY)

## 2018-02-09 PROCEDURE — 94799 UNLISTED PULMONARY SVC/PX: CPT

## 2018-02-09 PROCEDURE — 80048 BASIC METABOLIC PNL TOTAL CA: CPT | Performed by: NURSE PRACTITIONER

## 2018-02-09 PROCEDURE — 99291 CRITICAL CARE FIRST HOUR: CPT | Performed by: INTERNAL MEDICINE

## 2018-02-09 PROCEDURE — 25010000002 HEPARIN (PORCINE) PER 1000 UNITS: Performed by: PHYSICIAN ASSISTANT

## 2018-02-09 PROCEDURE — 25010000002 HYDROMORPHONE PER 4 MG: Performed by: INTERNAL MEDICINE

## 2018-02-09 PROCEDURE — 99024 POSTOP FOLLOW-UP VISIT: CPT | Performed by: PHYSICIAN ASSISTANT

## 2018-02-09 PROCEDURE — 94640 AIRWAY INHALATION TREATMENT: CPT

## 2018-02-09 PROCEDURE — 94760 N-INVAS EAR/PLS OXIMETRY 1: CPT

## 2018-02-09 PROCEDURE — 25010000002 KETOROLAC TROMETHAMINE PER 15 MG: Performed by: INTERNAL MEDICINE

## 2018-02-09 RX ORDER — KETOROLAC TROMETHAMINE 15 MG/ML
15 INJECTION, SOLUTION INTRAMUSCULAR; INTRAVENOUS EVERY 6 HOURS
Status: COMPLETED | OUTPATIENT
Start: 2018-02-09 | End: 2018-02-10

## 2018-02-09 RX ORDER — GUAIFENESIN 600 MG/1
1200 TABLET, EXTENDED RELEASE ORAL EVERY 12 HOURS SCHEDULED
Status: DISCONTINUED | OUTPATIENT
Start: 2018-02-09 | End: 2018-02-14 | Stop reason: HOSPADM

## 2018-02-09 RX ORDER — AMOXICILLIN AND CLAVULANATE POTASSIUM 875; 125 MG/1; MG/1
1 TABLET, FILM COATED ORAL EVERY 12 HOURS
Status: DISCONTINUED | OUTPATIENT
Start: 2018-02-09 | End: 2018-02-14 | Stop reason: HOSPADM

## 2018-02-09 RX ORDER — FUROSEMIDE 10 MG/ML
40 INJECTION INTRAMUSCULAR; INTRAVENOUS ONCE
Status: COMPLETED | OUTPATIENT
Start: 2018-02-09 | End: 2018-02-11

## 2018-02-09 RX ORDER — IPRATROPIUM BROMIDE AND ALBUTEROL SULFATE 2.5; .5 MG/3ML; MG/3ML
3 SOLUTION RESPIRATORY (INHALATION)
Status: DISCONTINUED | OUTPATIENT
Start: 2018-02-09 | End: 2018-02-14

## 2018-02-09 RX ORDER — SENNA AND DOCUSATE SODIUM 50; 8.6 MG/1; MG/1
2 TABLET, FILM COATED ORAL DAILY
Status: DISCONTINUED | OUTPATIENT
Start: 2018-02-09 | End: 2018-02-14 | Stop reason: HOSPADM

## 2018-02-09 RX ADMIN — HYDROMORPHONE HYDROCHLORIDE 0.5 MG: 10 INJECTION, SOLUTION INTRAMUSCULAR; INTRAVENOUS; SUBCUTANEOUS at 15:49

## 2018-02-09 RX ADMIN — HEPARIN SODIUM 5000 UNITS: 5000 INJECTION, SOLUTION INTRAVENOUS; SUBCUTANEOUS at 21:35

## 2018-02-09 RX ADMIN — Medication 2 TABLET: at 12:38

## 2018-02-09 RX ADMIN — BUPROPION HYDROCHLORIDE 300 MG: 150 TABLET, FILM COATED, EXTENDED RELEASE ORAL at 06:00

## 2018-02-09 RX ADMIN — DESVENLAFAXINE SUCCINATE 50 MG: 50 TABLET, EXTENDED RELEASE ORAL at 09:48

## 2018-02-09 RX ADMIN — HYDROMORPHONE HYDROCHLORIDE 0.5 MG: 10 INJECTION, SOLUTION INTRAMUSCULAR; INTRAVENOUS; SUBCUTANEOUS at 04:43

## 2018-02-09 RX ADMIN — SODIUM CHLORIDE 250 ML: 9 INJECTION, SOLUTION INTRAVENOUS at 02:47

## 2018-02-09 RX ADMIN — HYDROMORPHONE HYDROCHLORIDE 0.5 MG: 10 INJECTION, SOLUTION INTRAMUSCULAR; INTRAVENOUS; SUBCUTANEOUS at 21:36

## 2018-02-09 RX ADMIN — GUAIFENESIN 1200 MG: 600 TABLET, EXTENDED RELEASE ORAL at 21:36

## 2018-02-09 RX ADMIN — HYDROMORPHONE HYDROCHLORIDE 0.5 MG: 10 INJECTION, SOLUTION INTRAMUSCULAR; INTRAVENOUS; SUBCUTANEOUS at 00:36

## 2018-02-09 RX ADMIN — IPRATROPIUM BROMIDE AND ALBUTEROL SULFATE 3 ML: .5; 3 SOLUTION RESPIRATORY (INHALATION) at 15:48

## 2018-02-09 RX ADMIN — HYDROMORPHONE HYDROCHLORIDE 0.5 MG: 10 INJECTION, SOLUTION INTRAMUSCULAR; INTRAVENOUS; SUBCUTANEOUS at 22:18

## 2018-02-09 RX ADMIN — KETOROLAC TROMETHAMINE 15 MG: 15 INJECTION, SOLUTION INTRAMUSCULAR; INTRAVENOUS at 21:36

## 2018-02-09 RX ADMIN — IPRATROPIUM BROMIDE AND ALBUTEROL SULFATE 3 ML: .5; 3 SOLUTION RESPIRATORY (INHALATION) at 11:52

## 2018-02-09 RX ADMIN — IPRATROPIUM BROMIDE AND ALBUTEROL SULFATE 3 ML: .5; 3 SOLUTION RESPIRATORY (INHALATION) at 22:42

## 2018-02-09 RX ADMIN — KETOROLAC TROMETHAMINE 15 MG: 15 INJECTION, SOLUTION INTRAMUSCULAR; INTRAVENOUS at 15:49

## 2018-02-09 RX ADMIN — IPRATROPIUM BROMIDE AND ALBUTEROL SULFATE 3 ML: .5; 3 SOLUTION RESPIRATORY (INHALATION) at 07:21

## 2018-02-09 RX ADMIN — AMOXICILLIN AND CLAVULANATE POTASSIUM 1 TABLET: 875; 125 TABLET, FILM COATED ORAL at 18:53

## 2018-02-09 RX ADMIN — ACETAMINOPHEN 650 MG: 325 TABLET, FILM COATED ORAL at 04:50

## 2018-02-09 RX ADMIN — Medication: at 09:49

## 2018-02-09 RX ADMIN — GUAIFENESIN 1200 MG: 600 TABLET, EXTENDED RELEASE ORAL at 12:38

## 2018-02-09 RX ADMIN — KETOROLAC TROMETHAMINE 15 MG: 15 INJECTION, SOLUTION INTRAMUSCULAR; INTRAVENOUS at 09:48

## 2018-02-09 RX ADMIN — IPRATROPIUM BROMIDE AND ALBUTEROL SULFATE 3 ML: .5; 3 SOLUTION RESPIRATORY (INHALATION) at 19:18

## 2018-02-09 RX ADMIN — SODIUM CHLORIDE 500 ML: 9 INJECTION, SOLUTION INTRAVENOUS at 15:50

## 2018-02-09 RX ADMIN — HEPARIN SODIUM 5000 UNITS: 5000 INJECTION, SOLUTION INTRAVENOUS; SUBCUTANEOUS at 08:01

## 2018-02-09 NOTE — PROGRESS NOTES
"Intensivist Note     2/9/2018  Hospital Day: 2      Ms. Janna Muñiz, 62 y.o. female is followed for:  Principal Problem:    Right upper lobe lung nodule/mass. Noncaseating granuloma on pathology  Active Problems:    S/P video assisted right thoracotomy and wedge resection of right upper lobe nodule/mass. 2/7/18       SUBJECTIVE     Subjective    62-year-old white female lifelong nonsmoker initially seen 11/30/17 for chronic cough. Workup by Dr. Denny included a HRCT revealing 8 x 10 mm right upper lobe spiculated nodule. PET scan 12/28/17 was negative. Cough persisted, and because of the possibility of a slowly growing low-grade malignancy, resection was recommended by Dr. Denny.    Patient underwent a VAT with biopsy and resection of the nodule 2/7/18 and this turned out to reveal noncaseating granuloma. Today the patient is doing reasonably well except her level of pain is an 8/10. She has been coughing a great deal although she has had no fever, chills, or leukocytosis. She has difficulty clearing her secretions. Her pain persists despite the epidural that is in place.    The patient's relevant past medical, surgical and social history were reviewed and updated in Epic as appropriate.    OBJECTIVE     /56  Pulse 103  Temp 98.9 °F (37.2 °C) (Axillary)   Resp 20  Ht 160 cm (63\")  Wt 62.6 kg (138 lb)  SpO2 99%  Breastfeeding? No  BMI 24.45 kg/m2  Oxygen Concentration (%): 35  Flow (L/min): 2    Flowsheet Rows         First Filed Value    Admission Height  160 cm (63\") Documented at 02/07/2018 0613    Admission Weight  62.6 kg (138 lb) Documented at 02/07/2018 0613        Intake & Output (last day)       02/08 0701 - 02/09 0700 02/09 0701 - 02/10 0700    I.V. (mL/kg)      IV Piggyback 302.7     Epidural 36.7     Total Intake(mL/kg) 339.4 (5.4)     Urine (mL/kg/hr) 600 (0.4) 360 (0.6)    Other 100 (0.1) 40 (0.1)    Blood      Total Output 700 400    Net -360.6 -400          "       Objective    Exam:  General Exam:  Middle-aged white female who appears uncomfortable  HEENT: Pupils equal and reactive. Nose and throat clear.  Neck:                          Supple, no JVD, thyromegaly, or adenopathy  Lungs: Scattered rhonchi. She underventilate's and has a weak cough and does not clear her secretions well.  Chest:                         Right chest tube in place with a tiny air leak  Back:                           Epidural catheter in place  Cardiovascular: Regular rate and rhythm without murmurs or gallops.  Abdomen: Soft nontender without organomegaly or masses.   and rectal: Tejada catheter in place  Extremities: No cyanosis clubbing edema.  Neurologic:                 Symmetric strength. No focal deficits.    Chest X-Ray: Normal heart size. Left lung clear. Right chest tube in place. There is some parenchymal infiltrate in the right upper lobe which is persistent from yesterday's film. There is subcutaneous air noted in the right lateral chest wall which seems more prominent than yesterday. Stomach also appears slightly dilated.    INFUSIONS    Morphine and bupivacaine epidural  Last Rate: 4 mL/hr at 02/08/18 1839         Results from last 7 days  Lab Units 02/08/18  0704   WBC 10*3/mm3 8.63   HEMOGLOBIN g/dL 12.8   HEMATOCRIT % 37.3   PLATELETS 10*3/mm3 166       Results from last 7 days  Lab Units 02/09/18  0440 02/08/18  0704   SODIUM mmol/L 135 134   POTASSIUM mmol/L 5.0 3.9   CHLORIDE mmol/L 102 100   CO2 mmol/L 30.0 26.0   BUN mg/dL 19 13   CREATININE mg/dL 1.00 0.80   GLUCOSE mg/dL 110* 121*   CALCIUM mg/dL 9.2 8.7           No results found for: SEDRATE  No results found for: BNP  No results found for: CKTOTAL, CKMB, CKMBINDEX, TROPONINI, TROPONINT  No results found for: TSH  No results found for: LACTATE  No results found for: CORTISOL      I reviewed the patient's results, images and medication.    Assessment/Plan   ASSESSMENT      Principal Problem:    Right upper lobe  lung nodule/mass. Noncaseating granuloma on pathology  Active Problems:    S/P video assisted right thoracotomy and wedge resection of right upper lobe nodule/mass. 2/7/18      DISCUSSION: Patient had a low-grade temp of 101.3 yesterday but white count is normal and she has no further fever today. She does have a weak cough and does have some parenchymal infiltrate in the right upper lobe. I suspect these are just postoperative changes, but I am going to get a sputum culture and consider empirically placing her on oral Augmentin for now.    PLAN     1. Resume her home Wellbutrin and Pristiq  2. Toradol to see if we can improve her pain and allow her to cough more effectively  3. Guaifenesin  4. Stool softener  5. Sputum culture  6. Augmentin  7. Follow-up right upper lobe infiltrate and white count    Plan of care and goals reviewed with mulitdisciplinary team at daily rounds.    I discussed the patient's findings and my recommendations with patient and nursing staff    Time spent Critical care 32 min (It does not include procedure time).    Ivan Majano MD  Intensive Care Medicine  02/09/18 4:45 PM

## 2018-02-09 NOTE — PROGRESS NOTES
Continued Stay Note  ASHLY Smith     Patient Name: Janna Muñiz  MRN: [id number]  Today's Date: 2/9/2018    Admit Date: 2/7/2018          Discharge Plan       02/09/18 1126    Case Management/Social Work Plan    Plan Home     Patient/Family In Agreement With Plan yes    Additional Comments Patient plans to go home at discharge and the plan is to stay with her dtr Spring. Spring lives in the same complex but she has a first floor apartment. CM to follow for discharge needs. Hallie @ [phone]               Discharge Codes     None            Jeny Recinos RN

## 2018-02-09 NOTE — PLAN OF CARE
Problem: Patient Care Overview (Adult)  Goal: Plan of Care Review  Outcome: Ongoing (interventions implemented as appropriate)   02/08/18 0301 02/09/18 0554   Coping/Psychosocial Response Interventions   Plan Of Care Reviewed With --  patient   Patient Care Overview   Progress progress toward functional goals as expected --    Outcome Evaluation   Outcome Summary/Follow up Plan --  Patients pain has been managed with IV medications and epidural. VS stable. Patient remains A&O. Patiient has been on 6LNC and aerosol mask at 35% throughout the night. Will get patient up to chair this am.        Problem: Pressure Ulcer Risk (Deo Scale) (Adult,Obstetrics,Pediatric)  Goal: Identify Related Risk Factors and Signs and Symptoms  Outcome: Ongoing (interventions implemented as appropriate)   02/07/18 2058   Pressure Ulcer Risk (Deo Scale)   Related Risk Factors (Pressure Ulcer Risk (Deo Scale)) critical care admission;mobility impaired     Goal: Skin Integrity  Outcome: Ongoing (interventions implemented as appropriate)   02/09/18 0554   Pressure Ulcer Risk (Deo Scale) (Adult,Obstetrics,Pediatric)   Skin Integrity making progress toward outcome       Problem: Lung Surgery (via Thoracotomy) (Adult)  Goal: Signs and Symptoms of Listed Potential Problems Will be Absent or Manageable (Lung Surgery)  Outcome: Ongoing (interventions implemented as appropriate)   02/09/18 0554   Lung Surgery (via Thoracotomy)   Problems Assessed (Lung Surgery) all   Problems Present (Lung Surgery) pain;dyspnea;situational response

## 2018-02-09 NOTE — PROGRESS NOTES
"CTS Progress Note      POD 2 s/p RUL wedge X3       LOS: 2 days   Patient Care Team:  Navjot Harris MD as PCP - General (Family Medicine)    Subjective  Mild SOA  Up in chair  cooperative    CC: sore about a \"6-7\"    Objective    Vital Signs  Temp:  [98.8 °F (37.1 °C)-101.3 °F (38.5 °C)] 101.3 °F (38.5 °C)  Heart Rate:  [] 104  Resp:  [12-20] 18  BP: ()/(48-64) 106/59    Physical Exam:   General Appearance: alert, appears stated age and cooperative   Lungs: rhonchi diffuse   Heart: regular rhythm & normal rate, normal S1, S2, no murmur, no bora, no rub and no click   Skin:  Incision c/d/i     Results     Results from last 7 days  Lab Units 02/08/18  0704   WBC 10*3/mm3 8.63   HEMOGLOBIN g/dL 12.8   HEMATOCRIT % 37.3   PLATELETS 10*3/mm3 166       Results from last 7 days  Lab Units 02/09/18  0440   SODIUM mmol/L 135   POTASSIUM mmol/L 5.0   CHLORIDE mmol/L 102   CO2 mmol/L 30.0   BUN mg/dL 19   CREATININE mg/dL 1.00   GLUCOSE mg/dL 110*   CALCIUM mg/dL 9.2           Imaging Results (last 24 hours)     Procedure Component Value Units Date/Time    XR Chest 1 View [[id number]] Collected:  02/08/18 0858     Updated:  02/08/18 1304    Narrative:       EXAMINATION: XR CHEST 1 VW-02/08/2018:      INDICATION: Postop; R91.1-Solitary pulmonary nodule; R91.8-Other  nonspecific abnormal finding of lung field.      COMPARISON: NONE.     FINDINGS:   1. Chest tubes in the right hemithorax well positioned. Detectable  pneumothorax on the right is not identified.  2. There is mild hazy opacity in the right upper lobe, improved from  yesterday. Right lung base has cleared. There is volume loss in the  right chest and there is air in the subcutis tissues around the right  lower chest indicating a mild pleural leak. This is new and should be  noted.  3. The left lung is clear with the exception of some mild vertical  atelectasis. The left lung has improved considerably from yesterday.           Impression:       From " yesterday's images, the right upper lobe density is  less prominent and somewhat improved. The left lung base has nearly  completely cleared. There is new subcutaneous emphysema around the right  lower chest indicating a likely pleural leak, however, a pulmonary  embolus is not currently identified on the right.     D:  02/08/2018  E:  02/08/2018     This report was finalized on 2/8/2018 1:01 PM by Dr. Shawn Boothe MD.       XR Chest 1 View [[id number]] Updated:  02/09/18 0437          Assessment    Principal Problem:    Mass of upper lobe of right lung  Active Problems:    Lung nodule  some subQ air on right chest  Small air leak from CTs  Now on 35% fiO2 mask      Plan   Will diurese, ambulate, motivate  ANASTASIA Richardson  02/09/18  6:47 AM  \  As above Agree with diuresis. ? telemetry in AM with CT's out.I have reviewed, verified, and confirmed the above history and current status.  I have examined the patient and confirmed the above physical findings.    Mir Lopez MD  CTSurgery  02/09/18   5:33 PM

## 2018-02-09 NOTE — PLAN OF CARE
Problem: Patient Care Overview (Adult)  Goal: Plan of Care Review  Outcome: Ongoing (interventions implemented as appropriate)   02/09/18 1729   Coping/Psychosocial Response Interventions   Plan Of Care Reviewed With patient   Patient Care Overview   Progress improving   Outcome Evaluation   Outcome Summary/Follow up Plan epidural basal rate decreased r/t drowsiness, tordol added scheduled, sanchez catheter removed, on 2 L NC-- oxygen saturation good, a little hypotensive at times, received 500ml NS bolus, neeeds sputum sample.        Problem: Pressure Ulcer Risk (Deo Scale) (Adult,Obstetrics,Pediatric)  Goal: Identify Related Risk Factors and Signs and Symptoms  Outcome: Ongoing (interventions implemented as appropriate)   02/09/18 1729   Pressure Ulcer Risk (Deo Scale)   Related Risk Factors (Pressure Ulcer Risk (Deo Scale)) critical care admission;medical devices;mobility impaired     Goal: Skin Integrity  Outcome: Ongoing (interventions implemented as appropriate)   02/09/18 1729   Pressure Ulcer Risk (Deo Scale) (Adult,Obstetrics,Pediatric)   Skin Integrity making progress toward outcome       Problem: Lung Surgery (via Thoracotomy) (Adult)  Goal: Signs and Symptoms of Listed Potential Problems Will be Absent or Manageable (Lung Surgery)  Outcome: Ongoing (interventions implemented as appropriate)   02/09/18 1729   Lung Surgery (via Thoracotomy)   Problems Assessed (Lung Surgery) all   Problems Present (Lung Surgery) atelectasis;pain

## 2018-02-09 NOTE — PROGRESS NOTES
Multidisciplinary Rounds    Time: 20min  Patient Name: Janna Muñiz  Date of Encounter: 02/09/18 10:40 AM  MRN: [id number]  Admission date: 2/7/2018      Reason for visit: LATANYA. FABIANA to continue to follow per protocol.       Current diet: Diet Regular      Intervention:  Follow treatment plan  Care plan reviewed    Follow up:   Per protocol      Roxann Trimble MS RD/MAURILIO CNSC  10:40 AM

## 2018-02-10 ENCOUNTER — APPOINTMENT (OUTPATIENT)
Dept: GENERAL RADIOLOGY | Facility: HOSPITAL | Age: 63
End: 2018-02-10

## 2018-02-10 LAB
ANION GAP SERPL CALCULATED.3IONS-SCNC: 5 MMOL/L (ref 3–11)
BASOPHILS # BLD AUTO: 0.01 10*3/MM3 (ref 0–0.2)
BASOPHILS NFR BLD AUTO: 0.2 % (ref 0–1)
BUN BLD-MCNC: 16 MG/DL (ref 9–23)
BUN/CREAT SERPL: 20 (ref 7–25)
CALCIUM SPEC-SCNC: 8.2 MG/DL (ref 8.7–10.4)
CHLORIDE SERPL-SCNC: 101 MMOL/L (ref 99–109)
CO2 SERPL-SCNC: 29 MMOL/L (ref 20–31)
CREAT BLD-MCNC: 0.8 MG/DL (ref 0.6–1.3)
DEPRECATED RDW RBC AUTO: 41 FL (ref 37–54)
EOSINOPHIL # BLD AUTO: 0.18 10*3/MM3 (ref 0–0.3)
EOSINOPHIL NFR BLD AUTO: 2.9 % (ref 0–3)
ERYTHROCYTE [DISTWIDTH] IN BLOOD BY AUTOMATED COUNT: 12.4 % (ref 11.3–14.5)
GFR SERPL CREATININE-BSD FRML MDRD: 73 ML/MIN/1.73
GLUCOSE BLD-MCNC: 102 MG/DL (ref 70–100)
HCT VFR BLD AUTO: 31 % (ref 34.5–44)
HGB BLD-MCNC: 10.8 G/DL (ref 11.5–15.5)
IMM GRANULOCYTES # BLD: 0.01 10*3/MM3 (ref 0–0.03)
IMM GRANULOCYTES NFR BLD: 0.2 % (ref 0–0.6)
LYMPHOCYTES # BLD AUTO: 0.92 10*3/MM3 (ref 0.6–4.8)
LYMPHOCYTES NFR BLD AUTO: 14.9 % (ref 24–44)
MCH RBC QN AUTO: 31.7 PG (ref 27–31)
MCHC RBC AUTO-ENTMCNC: 34.8 G/DL (ref 32–36)
MCV RBC AUTO: 90.9 FL (ref 80–99)
MONOCYTES # BLD AUTO: 0.58 10*3/MM3 (ref 0–1)
MONOCYTES NFR BLD AUTO: 9.4 % (ref 0–12)
NEUTROPHILS # BLD AUTO: 4.47 10*3/MM3 (ref 1.5–8.3)
NEUTROPHILS NFR BLD AUTO: 72.4 % (ref 41–71)
PLATELET # BLD AUTO: 162 10*3/MM3 (ref 150–450)
PMV BLD AUTO: 9.7 FL (ref 6–12)
POTASSIUM BLD-SCNC: 4.3 MMOL/L (ref 3.5–5.5)
RBC # BLD AUTO: 3.41 10*6/MM3 (ref 3.89–5.14)
SODIUM BLD-SCNC: 135 MMOL/L (ref 132–146)
WBC NRBC COR # BLD: 6.17 10*3/MM3 (ref 3.5–10.8)

## 2018-02-10 PROCEDURE — 94799 UNLISTED PULMONARY SVC/PX: CPT

## 2018-02-10 PROCEDURE — 97162 PT EVAL MOD COMPLEX 30 MIN: CPT

## 2018-02-10 PROCEDURE — 94640 AIRWAY INHALATION TREATMENT: CPT

## 2018-02-10 PROCEDURE — 85025 COMPLETE CBC W/AUTO DIFF WBC: CPT | Performed by: INTERNAL MEDICINE

## 2018-02-10 PROCEDURE — 71045 X-RAY EXAM CHEST 1 VIEW: CPT

## 2018-02-10 PROCEDURE — 25010000002 HEPARIN (PORCINE) PER 1000 UNITS: Performed by: PHYSICIAN ASSISTANT

## 2018-02-10 PROCEDURE — 80048 BASIC METABOLIC PNL TOTAL CA: CPT | Performed by: INTERNAL MEDICINE

## 2018-02-10 PROCEDURE — 25010000002 PROMETHAZINE PER 50 MG: Performed by: NURSE ANESTHETIST, CERTIFIED REGISTERED

## 2018-02-10 PROCEDURE — 25010000002 KETOROLAC TROMETHAMINE PER 15 MG: Performed by: INTERNAL MEDICINE

## 2018-02-10 PROCEDURE — 94760 N-INVAS EAR/PLS OXIMETRY 1: CPT

## 2018-02-10 PROCEDURE — 25010000002 ONDANSETRON PER 1 MG: Performed by: PHYSICIAN ASSISTANT

## 2018-02-10 PROCEDURE — 25010000002 HYDROMORPHONE PER 4 MG: Performed by: INTERNAL MEDICINE

## 2018-02-10 PROCEDURE — 99233 SBSQ HOSP IP/OBS HIGH 50: CPT | Performed by: INTERNAL MEDICINE

## 2018-02-10 RX ORDER — DEXTROMETHORPHAN POLISTIREX 30 MG/5ML
60 SUSPENSION ORAL EVERY 12 HOURS PRN
Status: DISCONTINUED | OUTPATIENT
Start: 2018-02-10 | End: 2018-02-12

## 2018-02-10 RX ADMIN — OXYCODONE AND ACETAMINOPHEN 1 TABLET: 5; 325 TABLET ORAL at 23:31

## 2018-02-10 RX ADMIN — PROMETHAZINE HYDROCHLORIDE 6.25 MG: 25 INJECTION INTRAMUSCULAR; INTRAVENOUS at 11:55

## 2018-02-10 RX ADMIN — ONDANSETRON HYDROCHLORIDE 4 MG: 2 INJECTION, SOLUTION INTRAMUSCULAR; INTRAVENOUS at 03:54

## 2018-02-10 RX ADMIN — ONDANSETRON 4 MG: 4 TABLET, FILM COATED ORAL at 19:31

## 2018-02-10 RX ADMIN — IPRATROPIUM BROMIDE AND ALBUTEROL SULFATE 3 ML: .5; 3 SOLUTION RESPIRATORY (INHALATION) at 20:07

## 2018-02-10 RX ADMIN — IPRATROPIUM BROMIDE AND ALBUTEROL SULFATE 3 ML: .5; 3 SOLUTION RESPIRATORY (INHALATION) at 13:17

## 2018-02-10 RX ADMIN — KETOROLAC TROMETHAMINE 30 MG: 30 INJECTION, SOLUTION INTRAMUSCULAR at 08:26

## 2018-02-10 RX ADMIN — IPRATROPIUM BROMIDE AND ALBUTEROL SULFATE 3 ML: .5; 3 SOLUTION RESPIRATORY (INHALATION) at 07:51

## 2018-02-10 RX ADMIN — HYDROMORPHONE HYDROCHLORIDE 0.5 MG: 10 INJECTION, SOLUTION INTRAMUSCULAR; INTRAVENOUS; SUBCUTANEOUS at 03:54

## 2018-02-10 RX ADMIN — DEXTROMETHORPHAN 60 MG: 30 SUSPENSION, EXTENDED RELEASE ORAL at 08:22

## 2018-02-10 RX ADMIN — AMOXICILLIN AND CLAVULANATE POTASSIUM 1 TABLET: 875; 125 TABLET, FILM COATED ORAL at 05:05

## 2018-02-10 RX ADMIN — GUAIFENESIN 1200 MG: 600 TABLET, EXTENDED RELEASE ORAL at 21:00

## 2018-02-10 RX ADMIN — KETOROLAC TROMETHAMINE 30 MG: 30 INJECTION, SOLUTION INTRAMUSCULAR at 16:16

## 2018-02-10 RX ADMIN — DESVENLAFAXINE SUCCINATE 50 MG: 50 TABLET, EXTENDED RELEASE ORAL at 08:21

## 2018-02-10 RX ADMIN — IPRATROPIUM BROMIDE AND ALBUTEROL SULFATE 3 ML: .5; 3 SOLUTION RESPIRATORY (INHALATION) at 15:29

## 2018-02-10 RX ADMIN — OXYCODONE AND ACETAMINOPHEN 1 TABLET: 5; 325 TABLET ORAL at 19:31

## 2018-02-10 RX ADMIN — BUPROPION HYDROCHLORIDE 300 MG: 150 TABLET, FILM COATED, EXTENDED RELEASE ORAL at 08:21

## 2018-02-10 RX ADMIN — HEPARIN SODIUM 5000 UNITS: 5000 INJECTION, SOLUTION INTRAVENOUS; SUBCUTANEOUS at 21:00

## 2018-02-10 RX ADMIN — KETOROLAC TROMETHAMINE 30 MG: 30 INJECTION, SOLUTION INTRAMUSCULAR at 21:45

## 2018-02-10 RX ADMIN — AMOXICILLIN AND CLAVULANATE POTASSIUM 1 TABLET: 875; 125 TABLET, FILM COATED ORAL at 18:10

## 2018-02-10 RX ADMIN — KETOROLAC TROMETHAMINE 15 MG: 15 INJECTION, SOLUTION INTRAMUSCULAR; INTRAVENOUS at 03:55

## 2018-02-10 RX ADMIN — HYDROMORPHONE HYDROCHLORIDE 0.5 MG: 10 INJECTION, SOLUTION INTRAMUSCULAR; INTRAVENOUS; SUBCUTANEOUS at 11:55

## 2018-02-10 RX ADMIN — HEPARIN SODIUM 5000 UNITS: 5000 INJECTION, SOLUTION INTRAVENOUS; SUBCUTANEOUS at 08:22

## 2018-02-10 RX ADMIN — Medication 2 TABLET: at 08:24

## 2018-02-10 RX ADMIN — IPRATROPIUM BROMIDE AND ALBUTEROL SULFATE 3 ML: .5; 3 SOLUTION RESPIRATORY (INHALATION) at 03:40

## 2018-02-10 RX ADMIN — GUAIFENESIN 1200 MG: 600 TABLET, EXTENDED RELEASE ORAL at 08:22

## 2018-02-10 NOTE — THERAPY EVALUATION
Acute Care - Physical Therapy Initial Evaluation  [facility]     Patient Name: Janna Muñiz  : 1955  MRN: [id number]  Today's Date: 2/10/2018   Onset of Illness/Injury or Date of Surgery Date: 18  Date of Referral to PT: 02/10/18  Referring Physician: MD Lu      Admit Date: 2018     Visit Dx:    ICD-10-CM ICD-9-CM   1. Impaired functional mobility, balance, gait, and endurance Z74.09 V49.89   2. Lung nodule R91.1 793.11   3. Mass of upper lobe of right lung R91.8 786.6     Patient Active Problem List   Diagnosis   • Fibromyalgia   • Right upper lobe lung nodule/mass. Noncaseating granuloma on pathology   • Right upper lobe Lung nodule. Noncaseating granuloma on pathology   • S/P video assisted right thoracotomy and wedge resection of right upper lobe nodule/mass. 18     Past Medical History:   Diagnosis Date   • Anxiety    • Arthritis    • Bladder infection     3/year    • Bronchitis    • Depression     severe   • Fibromyalgia    • Lung mass    • Wears glasses      Past Surgical History:   Procedure Laterality Date   • CHOLECYSTECTOMY     • COLONOSCOPY  10/2017   • HYSTERECTOMY     • PARTIAL HYSTERECTOMY  2217-3104   • THORACOSCOPY Right 2018    Procedure: bronchoscopy, Video Assisted Thoracoscopy right side and open Thoracotomy with Right upper lobe wedge resection;  Surgeon: Mir Lopez MD;  Location: [facility] [place];  Service:           PT ASSESSMENT (last 72 hours)      PT Evaluation       02/10/18 1137 18 1137    Rehab Evaluation    Document Type evaluation  -KR     Subjective Information agree to therapy;complains of;pain;weakness  -KR     Patient Effort, Rehab Treatment good  -KR     Symptoms Noted During/After Treatment fatigue;increased pain  -KR     General Information    Patient Profile Review yes  -KR     Onset of Illness/Injury or Date of Surgery Date 18  -KR     Referring Physician MD Lu  -KR     Pertinent History Of Current  Problem Pt seen for evaluation of RUL solitary pulmonary nodule. Pt has had persistent cough for 3 months; treated for bronchitis on several occasions. CT scan showed spiculated nodule in RUL. Lesion has typical appearance of an early carcinoma. Bronchoscopy, thorascopy on R side, and open thoracotomy with RUL wedge resection.   -KR     Precautions/Limitations fall precautions;oxygen therapy device and L/min   R chest tube; thoracic epidural catheter  -KR     Prior Level of Function independent:;all household mobility;gait;transfer;ADL's;dressing;bathing  -KR     Equipment Currently Used at Home none  -KR none  -DS    Plans/Goals Discussed With patient;agreed upon  -KR     Risks Reviewed patient:;LOB;dizziness;increased discomfort;change in vital signs  -KR     Benefits Reviewed patient:;improve function;increase independence;increase strength;increase balance;decrease pain  -KR     Barriers to Rehab none identified  -KR     Living Environment    Lives With other (see comments)   granddaughter  -KR child(patrick), dependent   Patient lives with a 16 year old granddaughter   -DS    Living Arrangements apartment  -KR apartment  -DS    Home Accessibility stairs to enter home;bed and bath on same level;tub/shower is not walk in  -KR stairs to enter home   Patient lives in an apt on the second floor and no elevator. Plans to stay with dtr [place] in same complex but she has an apt on first floor.   -DS    Number of Stairs to Enter Home 15  -KR     Stair Railings at Home inside, present at both sides  -KR inside, present on left side  -DS    Type of Financial/Environmental Concern none  -KR     Transportation Available family or friend will provide  -KR family or friend will provide  -DS    Clinical Impression    Date of Referral to PT 02/10/18  -KR     PT Diagnosis impaired functional mobility  -KR     Patient/Family Goals Statement return to PLOF  -KR     Criteria for Skilled Therapeutic Interventions Met yes;treatment  indicated  -KR     Rehab Potential good, to achieve stated therapy goals  -KR     Vital Signs    Pre Systolic BP Rehab 88  -KR     Pre Treatment Diastolic BP 45  -KR     Post Systolic BP Rehab 103  -KR     Post Treatment Diastolic BP 45  -KR     Pretreatment Heart Rate (beats/min) 87  -KR     Posttreatment Heart Rate (beats/min) 91  -KR     O2 Delivery Pre Treatment supplemental O2  -KR     Post SpO2 (%) 99  -KR     O2 Delivery Post Treatment supplemental O2  -KR     Pre Patient Position Sitting  -KR     Intra Patient Position Standing  -KR     Post Patient Position Sitting  -KR     Pain Assessment    Pain Assessment 0-10  -KR     Pain Score 7  -KR     Post Pain Score 9  -KR     Pain Type Surgical pain  -KR     Pain Location Rib cage  -KR     Pain Orientation Right  -KR     Pain Intervention(s) Repositioned;Ambulation/increased activity  -KR     Response to Interventions tolerated  -KR     Cognitive Assessment/Intervention    Current Cognitive/Communication Assessment functional  -KR     Orientation Status oriented x 4  -KR     Follows Commands/Answers Questions able to follow single-step instructions;100% of the time;needs cueing;needs increased time  -KR     Personal Safety mild impairment;decreased awareness, need for assist  -KR     Personal Safety Interventions fall prevention program maintained;gait belt;nonskid shoes/slippers when out of bed  -KR     ROM (Range of Motion)    General ROM no range of motion deficits identified  -KR     General ROM Detail BLE WFL  -KR     MMT (Manual Muscle Testing)    General MMT Assessment lower extremity strength deficits identified  -KR     General MMT Assessment Detail BLE grossly 3+/5  -KR     Bed Mobility, Assessment/Treatment    Bed Mob, Supine to Sit, [place] not tested  -KR     Bed Mob, Sit to Supine, [place] not tested  -KR     Bed Mobility, Comment UIC  -KR     Transfer Assessment/Treatment    Transfers, Sit-Stand [place] minimum assist (75% patient  effort);2 person assist required;verbal cues required  -KR     Transfers, Stand-Sit [place] minimum assist (75% patient effort);2 person assist required;verbal cues required  -KR     Transfer, Safety Issues step length decreased;weight-shifting ability decreased  -KR     Transfer, Impairments strength decreased;impaired balance;pain  -KR     Transfer, Comment VC's for sequencing and hand placement.   -KR     Gait Assessment/Treatment    Gait, [place] Level minimum assist (75% patient effort);1 person + 1 person to manage equipment;verbal cues required  -KR     Gait, Assistive Device --   BUE support  -KR     Gait, Distance (Feet) 30  -KR     Gait, Gait Pattern Analysis swing-to gait  -KR     Gait, Gait Deviations bilateral:;antalgic;[place] decreased;forward flexed posture;step length decreased;weight-shifting ability decreased;decreased heel strike  -KR     Gait, Safety Issues step length decreased;weight-shifting ability decreased;supplemental O2  -KR     Gait, Impairments strength decreased;impaired balance;postural control impaired;pain  -KR     Gait, Comment Pt demonstrated slow [place] with decreased step length. Pt given VC's for upright posture and increased step length. Pt very shaky during ambulation due to increased anxiety. Chair follow.   -KR     Motor Skills/Interventions    Additional Documentation Balance Skills Training (Group)  -KR     Balance Skills Training    Sitting-Level of Assistance Close supervision  -KR     Sitting-Balance Support Right upper extremity supported;Left upper extremity supported;Feet supported  -KR     Standing-Level of Assistance Minimum assistance  -KR     Static Standing Balance Support Right upper extremity supported;Left upper extremity supported  -KR     Gait Balance-Level of Assistance Minimum assistance  -KR     Gait Balance Support Right upper extremity supported;Left upper extremity supported  -KR     Sensory Assessment/Intervention    Light Touch LLE;RLE   -KR     LLE Light Touch WNL  -KR     RLE Light Touch WNL  -KR     Positioning and Restraints    Pre-Treatment Position sitting in chair/recliner  -KR     Post Treatment Position chair  -KR     In Chair reclined;call light within reach;encouraged to call for assist;with family/caregiver;with nsg;legs elevated  -KR       02/07/18 1600       General Information    Equipment Currently Used at Home none  -JW     Living Environment    Lives With child(patrick), dependent  -JW     Living Arrangements apartment  -JW     Home Accessibility stairs to enter home  -JW     Stair Railings at Home inside, present on left side  -JW     Type of Financial/Environmental Concern none  -JW     Transportation Available car;family or friend will provide  -JW       User Key  (r) = Recorded By, (t) = Taken By, (c) = Cosigned By    Initials Name Provider Type    DS Jeny Recinos, RN Case Manager     Eva Cruz, RN Registered Nurse    KAYKAY Patino PT Physical Therapist          Physical Therapy Education     Title: PT OT SLP Therapies (Active)     Topic: Physical Therapy (Active)     Point: Mobility training (Active)    Learning Progress Summary    Learner Readiness Method Response Comment Documented by Status   Patient Acceptance E NR  KR 02/10/18 1329 Active               Point: Body mechanics (Active)    Learning Progress Summary    Learner Readiness Method Response Comment Documented by Status   Patient Acceptance E NR  KR 02/10/18 1329 Active               Point: Precautions (Active)    Learning Progress Summary    Learner Readiness Method Response Comment Documented by Status   Patient Acceptance E NR  KR 02/10/18 1329 Active                      User Key     Initials Effective Dates Name Provider Type Discipline    KR 09/25/17 -  Lavinia Patino PT Physical Therapist PT                PT Recommendation and Plan  Anticipated Discharge Disposition: home with home health, home with 24/7 care  PT Frequency: daily  Plan of Care  Review  Plan Of Care Reviewed With: patient  Outcome Summary/Follow up Plan: PT initial evaluation completed for pt s/p bronchoscopy and thoracotomy with RUL wedge resection. Pt presents with generalized weakness, SOA, and increased anxiety. Pt requires Trudy for transfers and to ambulate 30ft. Pt's decreased functional independence warrants PT skilled care. Recommend D/C home with assistance and home health.           IP PT Goals       02/10/18 1330          Bed Mobility PT LTG    Bed Mobility PT LTG, Date Established 02/10/18  -KR      Bed Mobility PT LTG, Time to Achieve 2 wks  -KR      Bed Mobility PT LTG, Activity Type supine to sit/sit to supine  -KR      Bed Mobility PT LTG, [place] Level independent  -KR      Bed Mobility PT LTG, Date Goal Reviewed 02/10/18  -KR      Bed Mobility PT LTG, Outcome goal ongoing  -KR      Transfer Training PT LTG    Transfer Training PT LTG, Date Established 02/10/18  -KR      Transfer Training PT LTG, Time to Achieve 2 wks  -KR      Transfer Training PT LTG, Activity Type sit to stand/stand to sit  -KR      Transfer Training PT LTG, [place] Level conditional independence  -KR      Transfer Training PT LTG, Assist Device walker, rolling  -KR      Transfer Training PT  LTG, Date Goal Reviewed 02/10/18  -KR      Transfer Training PT LTG, Outcome goal ongoing  -KR      Gait Training PT LTG    Gait Training Goal PT LTG, Date Established 02/10/18  -KR      Gait Training Goal PT LTG, Time to Achieve 2 wks  -KR      Gait Training Goal PT LTG, [place] Level conditional independence  -KR      Gait Training Goal PT LTG, Assist Device walker, rolling  -KR      Gait Training Goal PT LTG, Distance to Achieve 400 feet  -KR      Gait Training Goal PT LTG, Date Goal Reviewed 02/10/18  -KR      Gait Training Goal PT LTG, Outcome goal ongoing  -KR      Stair Training PT LTG    Stair Training Goal PT LTG, Date Established 02/10/18  -KR      Stair Training Goal PT LTG, Time to Achieve  2 wks  -KR      Stair Training Goal PT LTG, Number of Steps 15  -KR      Stair Training Goal PT LTG, [place] Level contact guard assist  -KR      Stair Training Goal PT LTG, Date Goal Reviewed 02/10/18  -KR      Stair Training Goal PT LTG, Outcome goal ongoing  -KR        User Key  (r) = Recorded By, (t) = Taken By, (c) = Cosigned By    Initials Name Provider Type    KAYKAY Patino PT Physical Therapist                Outcome Measures       02/10/18 1137          How much help from another person do you currently need...    Turning from your back to your side while in flat bed without using bedrails? 3  -KR      Moving from lying on back to sitting on the side of a flat bed without bedrails? 3  -KR      Moving to and from a bed to a chair (including a wheelchair)? 3  -KR      Standing up from a chair using your arms (e.g., wheelchair, bedside chair)? 3  -KR      Climbing 3-5 steps with a railing? 2  -KR      To walk in hospital room? 3  -KR      AM-PAC 6 Clicks Score 17  -KR      Functional Assessment    Outcome Measure Options AM-PAC 6 Clicks Basic Mobility (PT)  -KR        User Key  (r) = Recorded By, (t) = Taken By, (c) = Cosigned By    Initials Name Provider Type    KAYKAY Patino PT Physical Therapist           Time Calculation:         PT Charges       02/10/18 1333          Time Calculation    Start Time 1137  -KR      PT Received On 02/10/18  -KR      PT Goal Re-Cert Due Date 02/20/18  -KR        User Key  (r) = Recorded By, (t) = Taken By, (c) = Cosigned By    Initials Name Provider Type    KAYKAY Patino PT Physical Therapist          Therapy Charges for Today     Code Description Service Date Service Provider Modifiers Qty    54712612835 HC PT EVAL MOD COMPLEXITY 4 2/10/2018 Lavinia Patino, PT GP 1    94120089631 HC PT THER SUPP EA 15 MIN 2/10/2018 Lavinia Patino, PT GP 2          PT G-Codes  Outcome Measure Options: AM-PAC 6 Clicks Basic Mobility (PT)      Geneva Patino  PT  2/10/2018

## 2018-02-10 NOTE — PLAN OF CARE
Problem: Patient Care Overview (Adult)  Goal: Plan of Care Review  Outcome: Ongoing (interventions implemented as appropriate)   02/10/18 1433   Coping/Psychosocial Response Interventions   Plan Of Care Reviewed With patient   Patient Care Overview   Progress improving   Outcome Evaluation   Outcome Summary/Follow up Plan Pt has orders to tele. Pt ambulated in hallway with PT. Pt tolerated. Pt chest tube and epidural to remain. Pain controlled by meds.        Problem: Lung Surgery (via Thoracotomy) (Adult)  Goal: Signs and Symptoms of Listed Potential Problems Will be Absent or Manageable (Lung Surgery)  Outcome: Ongoing (interventions implemented as appropriate)   02/10/18 1433   Lung Surgery (via Thoracotomy)   Problems Assessed (Lung Surgery) all   Problems Present (Lung Surgery) pain;situational response

## 2018-02-10 NOTE — PLAN OF CARE
Problem: Patient Care Overview (Adult)  Goal: Plan of Care Review  Outcome: Ongoing (interventions implemented as appropriate)   02/10/18 1330   Coping/Psychosocial Response Interventions   Plan Of Care Reviewed With patient   Outcome Evaluation   Outcome Summary/Follow up Plan PT initial evaluation completed for pt s/p bronchoscopy and thoracotomy with RUL wedge resection. Pt presents with generalized weakness, SOA, and increased anxiety. Pt requires Trudy for transfers and to ambulate 30ft. Pt's decreased functional independence warrants PT skilled care. Recommend D/C home with assistance and home health.        Problem: Inpatient Physical Therapy  Goal: Bed Mobility Goal LTG- PT  Outcome: Ongoing (interventions implemented as appropriate)   02/10/18 1330   Bed Mobility PT LTG   Bed Mobility PT LTG, Date Established 02/10/18   Bed Mobility PT LTG, Time to Achieve 2 wks   Bed Mobility PT LTG, Activity Type supine to sit/sit to supine   Bed Mobility PT LTG, [place] Level independent   Bed Mobility PT LTG, Date Goal Reviewed 02/10/18   Bed Mobility PT LTG, Outcome goal ongoing     Goal: Transfer Training Goal 1 LTG- PT  Outcome: Ongoing (interventions implemented as appropriate)   02/10/18 1330   Transfer Training PT LTG   Transfer Training PT LTG, Date Established 02/10/18   Transfer Training PT LTG, Time to Achieve 2 wks   Transfer Training PT LTG, Activity Type sit to stand/stand to sit   Transfer Training PT LTG, [place] Level conditional independence   Transfer Training PT LTG, Assist Device walker, rolling   Transfer Training PT LTG, Date Goal Reviewed 02/10/18   Transfer Training PT LTG, Outcome goal ongoing     Goal: Gait Training Goal LTG- PT  Outcome: Ongoing (interventions implemented as appropriate)   02/10/18 1330   Gait Training PT LTG   Gait Training Goal PT LTG, Date Established 02/10/18   Gait Training Goal PT LTG, Time to Achieve 2 wks   Gait Training Goal PT LTG, [place] Level  conditional independence   Gait Training Goal PT LTG, Assist Device walker, rolling   Gait Training Goal PT LTG, Distance to Achieve 400 feet   Gait Training Goal PT LTG, Date Goal Reviewed 02/10/18   Gait Training Goal PT LTG, Outcome goal ongoing     Goal: Stair Training Goal LTG- PT  Outcome: Ongoing (interventions implemented as appropriate)   02/10/18 1330   Stair Training PT LTG   Stair Training Goal PT LTG, Date Established 02/10/18   Stair Training Goal PT LTG, Time to Achieve 2 wks   Stair Training Goal PT LTG, Number of Steps 15   Stair Training Goal PT LTG, [place] Level contact guard assist   Stair Training Goal PT LTG, Date Goal Reviewed 02/10/18   Stair Training Goal PT LTG, Outcome goal ongoing

## 2018-02-10 NOTE — PROGRESS NOTES
"INTENSIVIST NOTE     Hospital Day: 3      Ms. Janna Muñiz, 62 y.o. female is followed for:   Critical care management        SUBJECTIVE     62-year-old female status post video-assisted right thoracotomy and wedge resection of right upper lobe nodule 2/7/18.  Pathology consistent with noncaseating granuloma.    Interval history:    Pain control improved.  Afebrile.  Serosanguineous drainage from chest tube without clear air leak.  On low flow nasal cannula O2.    The patient's relevant past medical, surgical and social history were reviewed and updated in Epic as appropriate.       OBJECTIVE     Vital Sign Min/Max for last 24 hours  Temp  Min: 97.7 °F (36.5 °C)  Max: 98.7 °F (37.1 °C)   BP  Min: 84/63  Max: 146/61   Pulse  Min: 89  Max: 121   Resp  Min: 16  Max: 22   SpO2  Min: 86 %  Max: 100 %   Flow (L/min)  Min: 2  Max: 6   No Data Recorded      Intake/Output Summary (Last 24 hours) at 02/10/18 1116  Last data filed at 02/10/18 0400   Gross per 24 hour   Intake              910 ml   Output              520 ml   Net              390 ml      Flowsheet Rows         First Filed Value    Admission Height  160 cm (63\") Documented at 02/07/2018 0613    Admission Weight  62.6 kg (138 lb) Documented at 02/07/2018 0613         Last 3 weights    02/07/18  0613   Weight: 62.6 kg (138 lb)            Objective:  General Appearance:  In no acute distress.    Vital signs: (most recent): Blood pressure 90/60, pulse 101, temperature 98.5 °F (36.9 °C), temperature source Oral, resp. rate 20, height 160 cm (63\"), weight 62.6 kg (138 lb), SpO2 95 %, not currently breastfeeding.    HEENT: Normal HEENT exam.    Lungs:  Normal respiratory rate and normal effort.  She is not in respiratory distress.  Breath sounds clear to auscultation.  No wheezes, rales or rhonchi.    Heart: Normal rate.  Regular rhythm.  S1 normal and S2 normal.  No murmur, gallop or friction rub.   Chest: Symmetric chest wall expansion. (Right chest " tube)  Abdomen: Abdomen is soft and non-distended.  Bowel sounds are normal.   There is no abdominal tenderness.   There is no mass. There is no splenomegaly. There is no hepatomegaly.   Extremities: There is no deformity or dependent edema.    Neurological: Patient is alert and oriented to person, place and time.  (Tremor).    Pupils:  Pupils are equal, round, and reactive to light.    Skin:  Warm and dry.              I reviewed the patient's new clinical results.  I reviewed the patient's new imaging results/reports including actual images and agree with reports.      Chest X-Ray:  Right chest tube well-positioned without pneumothorax.    INFUSIONS    Morphine and bupivacaine epidural  Last Rate: 1 mL/hr at 02/09/18 1900         Results from last 7 days  Lab Units 02/10/18  0329 02/08/18  0704   WBC 10*3/mm3 6.17 8.63   HEMOGLOBIN g/dL 10.8* 12.8   HEMATOCRIT % 31.0* 37.3   PLATELETS 10*3/mm3 162 166       Results from last 7 days  Lab Units 02/10/18  0329 02/09/18  0440 02/08/18  0704   SODIUM mmol/L 135 135 134   POTASSIUM mmol/L 4.3 5.0 3.9   CHLORIDE mmol/L 101 102 100   CO2 mmol/L 29.0 30.0 26.0   BUN mg/dL 16 19 13   GLUCOSE mg/dL 102* 110* 121*   CREATININE mg/dL 0.80 1.00 0.80   CALCIUM mg/dL 8.2* 9.2 8.7                 [facility] Ventilation:      I reviewed the patient's medications.    Assessment/Plan   ASSESSMENT      Hospital Problem List     * (Principal)Right upper lobe lung nodule/mass. Noncaseating granuloma on pathology    Overview Signed 2/1/2018 11:50 AM by ANASTASIA Flowers     Added automatically from request for surgery [id number]         S/P video assisted right thoracotomy and wedge resection of right upper lobe nodule/mass. 2/7/18            Acceptable progress after right VATS and wedge resection of right upper lobe nodule which has proven to be a noncaseating granuloma.  She had fever and was started on Augmentin yesterday by Dr. Majano.  She has a right upper lobe infiltrate which is  likely postoperative in nature         PLAN     1. Surgery has cleared her for transfer to telemetry   2. Continue Augmentin   3. Mobilize   4. PT consult          I discussed the patient's findings and my recommendations with patient, family and nursing staff       Jovany Leigh MD  Pulmonary and Critical Care Medicine  02/10/18 11:16 AM

## 2018-02-10 NOTE — PLAN OF CARE
Problem: Patient Care Overview (Adult)  Goal: Plan of Care Review  Outcome: Ongoing (interventions implemented as appropriate)   02/10/18 0343   Coping/Psychosocial Response Interventions   Plan Of Care Reviewed With patient   Patient Care Overview   Progress improving   Outcome Evaluation   Outcome Summary/Follow up Plan Epidural continues. Dilaudid and toradol used as well for break through pain. Increased oxygen over night to 6L. delsym PRN d/t innefective dry hacking cough.        Problem: Lung Surgery (via Thoracotomy) (Adult)  Goal: Signs and Symptoms of Listed Potential Problems Will be Absent or Manageable (Lung Surgery)  Outcome: Ongoing (interventions implemented as appropriate)   02/10/18 0343   Lung Surgery (via Thoracotomy)   Problems Assessed (Lung Surgery) all   Problems Present (Lung Surgery) pain;atelectasis

## 2018-02-10 NOTE — PROGRESS NOTES
[facility]    Acute pain service Inpatient Progress Note    Patient Name: Janna Muñiz  :  1955  MRN:  [id number]        Acute Pain  Service Inpatient Progress Note:    Analgesia:Fair  Pain Score:5/10  LOC: alert and awake  Resp Status: supplemental oxygen  Cardiac: VS stable  Side Effects:None  Catheter Site:clean, dry and dressing intact  Cath type: Epidural cath(MOOG pump)  Volume: 1.  Catheter Plan:Catheter to remain [facility]

## 2018-02-10 NOTE — PROGRESS NOTES
Cardiothoracic Surgery Progress Note      POD # 3 s/p Right upper lobe wedge resection via thoracotomy       LOS: 3 days      Subjective:  Some pain    Objective:  Vital Signs  Temp:  [97.7 °F (36.5 °C)-98.7 °F (37.1 °C)] 98.5 °F (36.9 °C)  Heart Rate:  [] 101  Resp:  [16-22] 20  BP: ()/(49-79) 90/60    Physical Exam:   General Appearance: alert, appears stated age and cooperative   Lungs: clear to auscultation, respirations regular, respirations even and respirations unlabored   Heart: regular rhythm & normal rate, normal S1, S2 and no murmur, no bora, no rub   Skin: Incision c/d/i   CT with air leak  Results:    Results from last 7 days  Lab Units 02/10/18  0329   WBC 10*3/mm3 6.17   HEMOGLOBIN g/dL 10.8*   HEMATOCRIT % 31.0*   PLATELETS 10*3/mm3 162       Results from last 7 days  Lab Units 02/10/18  0329   SODIUM mmol/L 135   POTASSIUM mmol/L 4.3   CHLORIDE mmol/L 101   CO2 mmol/L 29.0   BUN mg/dL 16   CREATININE mg/dL 0.80   GLUCOSE mg/dL 102*   CALCIUM mg/dL 8.2*         Assessment:  POD # 3 s/p Right upper lobe wedge resection via thoracotomy, slow progress    Plan:  Transfer to telemetry  Pulmonary toilet  Ambulate  Continue chest tube until air leak resolves  Continue pain catheter until CT removed    Ivan Haynes MD  02/10/18  10:53 AM

## 2018-02-11 ENCOUNTER — APPOINTMENT (OUTPATIENT)
Dept: GENERAL RADIOLOGY | Facility: HOSPITAL | Age: 63
End: 2018-02-11

## 2018-02-11 LAB
ABO + RH BLD: NORMAL
ABO + RH BLD: NORMAL
BH BB BLOOD EXPIRATION DATE: NORMAL
BH BB BLOOD EXPIRATION DATE: NORMAL
BH BB BLOOD TYPE BARCODE: 6200
BH BB BLOOD TYPE BARCODE: 6200
BH BB DISPENSE STATUS: NORMAL
BH BB DISPENSE STATUS: NORMAL
BH BB PRODUCT CODE: NORMAL
BH BB PRODUCT CODE: NORMAL
BH BB UNIT NUMBER: NORMAL
BH BB UNIT NUMBER: NORMAL
FLUAV SUBTYP SPEC NAA+PROBE: NOT DETECTED
FLUBV RNA ISLT QL NAA+PROBE: NOT DETECTED
UNIT  ABO: NORMAL
UNIT  ABO: NORMAL
UNIT  RH: NORMAL
UNIT  RH: NORMAL

## 2018-02-11 PROCEDURE — 25010000002 KETOROLAC TROMETHAMINE PER 15 MG: Performed by: INTERNAL MEDICINE

## 2018-02-11 PROCEDURE — 87502 INFLUENZA DNA AMP PROBE: CPT | Performed by: INTERNAL MEDICINE

## 2018-02-11 PROCEDURE — 94799 UNLISTED PULMONARY SVC/PX: CPT

## 2018-02-11 PROCEDURE — 97116 GAIT TRAINING THERAPY: CPT

## 2018-02-11 PROCEDURE — 71045 X-RAY EXAM CHEST 1 VIEW: CPT

## 2018-02-11 PROCEDURE — 25010000002 ONDANSETRON PER 1 MG: Performed by: PHYSICIAN ASSISTANT

## 2018-02-11 PROCEDURE — 99232 SBSQ HOSP IP/OBS MODERATE 35: CPT | Performed by: INTERNAL MEDICINE

## 2018-02-11 PROCEDURE — 99024 POSTOP FOLLOW-UP VISIT: CPT | Performed by: PHYSICIAN ASSISTANT

## 2018-02-11 PROCEDURE — 94640 AIRWAY INHALATION TREATMENT: CPT

## 2018-02-11 PROCEDURE — 25010000002 FUROSEMIDE PER 20 MG: Performed by: PHYSICIAN ASSISTANT

## 2018-02-11 PROCEDURE — 25010000002 HEPARIN (PORCINE) PER 1000 UNITS: Performed by: PHYSICIAN ASSISTANT

## 2018-02-11 PROCEDURE — 94760 N-INVAS EAR/PLS OXIMETRY 1: CPT

## 2018-02-11 PROCEDURE — 97110 THERAPEUTIC EXERCISES: CPT

## 2018-02-11 RX ADMIN — IPRATROPIUM BROMIDE AND ALBUTEROL SULFATE 3 ML: .5; 3 SOLUTION RESPIRATORY (INHALATION) at 23:38

## 2018-02-11 RX ADMIN — Medication 2 TABLET: at 09:05

## 2018-02-11 RX ADMIN — GUAIFENESIN 1200 MG: 600 TABLET, EXTENDED RELEASE ORAL at 20:34

## 2018-02-11 RX ADMIN — OXYCODONE AND ACETAMINOPHEN 1 TABLET: 5; 325 TABLET ORAL at 03:59

## 2018-02-11 RX ADMIN — KETOROLAC TROMETHAMINE 30 MG: 30 INJECTION, SOLUTION INTRAMUSCULAR at 03:59

## 2018-02-11 RX ADMIN — AMOXICILLIN AND CLAVULANATE POTASSIUM 1 TABLET: 875; 125 TABLET, FILM COATED ORAL at 06:32

## 2018-02-11 RX ADMIN — FUROSEMIDE 40 MG: 10 INJECTION, SOLUTION INTRAMUSCULAR; INTRAVENOUS at 09:07

## 2018-02-11 RX ADMIN — DESVENLAFAXINE SUCCINATE 50 MG: 50 TABLET, EXTENDED RELEASE ORAL at 09:44

## 2018-02-11 RX ADMIN — AMOXICILLIN AND CLAVULANATE POTASSIUM 1 TABLET: 875; 125 TABLET, FILM COATED ORAL at 17:20

## 2018-02-11 RX ADMIN — HEPARIN SODIUM 5000 UNITS: 5000 INJECTION, SOLUTION INTRAVENOUS; SUBCUTANEOUS at 20:34

## 2018-02-11 RX ADMIN — ONDANSETRON HYDROCHLORIDE 4 MG: 2 INJECTION, SOLUTION INTRAMUSCULAR; INTRAVENOUS at 17:20

## 2018-02-11 RX ADMIN — IPRATROPIUM BROMIDE AND ALBUTEROL SULFATE 3 ML: .5; 3 SOLUTION RESPIRATORY (INHALATION) at 00:31

## 2018-02-11 RX ADMIN — IPRATROPIUM BROMIDE AND ALBUTEROL SULFATE 3 ML: .5; 3 SOLUTION RESPIRATORY (INHALATION) at 07:41

## 2018-02-11 RX ADMIN — HEPARIN SODIUM 5000 UNITS: 5000 INJECTION, SOLUTION INTRAVENOUS; SUBCUTANEOUS at 09:06

## 2018-02-11 RX ADMIN — ONDANSETRON HYDROCHLORIDE 4 MG: 2 INJECTION, SOLUTION INTRAMUSCULAR; INTRAVENOUS at 03:59

## 2018-02-11 RX ADMIN — DEXTROMETHORPHAN 60 MG: 30 SUSPENSION, EXTENDED RELEASE ORAL at 03:59

## 2018-02-11 RX ADMIN — BUPROPION HYDROCHLORIDE 300 MG: 150 TABLET, FILM COATED, EXTENDED RELEASE ORAL at 09:05

## 2018-02-11 RX ADMIN — IPRATROPIUM BROMIDE AND ALBUTEROL SULFATE 3 ML: .5; 3 SOLUTION RESPIRATORY (INHALATION) at 18:51

## 2018-02-11 RX ADMIN — IPRATROPIUM BROMIDE AND ALBUTEROL SULFATE 3 ML: .5; 3 SOLUTION RESPIRATORY (INHALATION) at 12:00

## 2018-02-11 RX ADMIN — GUAIFENESIN 1200 MG: 600 TABLET, EXTENDED RELEASE ORAL at 09:05

## 2018-02-11 RX ADMIN — OXYCODONE AND ACETAMINOPHEN 1 TABLET: 5; 325 TABLET ORAL at 17:20

## 2018-02-11 RX ADMIN — IPRATROPIUM BROMIDE AND ALBUTEROL SULFATE 3 ML: .5; 3 SOLUTION RESPIRATORY (INHALATION) at 03:37

## 2018-02-11 RX ADMIN — IPRATROPIUM BROMIDE AND ALBUTEROL SULFATE 3 ML: .5; 3 SOLUTION RESPIRATORY (INHALATION) at 16:00

## 2018-02-11 NOTE — PLAN OF CARE
Problem: Patient Care Overview (Adult)  Goal: Plan of Care Review  Outcome: Ongoing (interventions implemented as appropriate)   02/11/18 0238   Coping/Psychosocial Response Interventions   Plan Of Care Reviewed With patient   Patient Care Overview   Progress improving

## 2018-02-11 NOTE — PLAN OF CARE
Problem: Patient Care Overview (Adult)  Goal: Plan of Care Review  Outcome: Ongoing (interventions implemented as appropriate)   02/11/18 1128   Coping/Psychosocial Response Interventions   Plan Of Care Reviewed With patient   Patient Care Overview   Progress improving   Outcome Evaluation   Outcome Summary/Follow up Plan Pt ambulated 46' with HHA on o2.       Problem: Inpatient Physical Therapy  Goal: Bed Mobility Goal LTG- PT  Outcome: Ongoing (interventions implemented as appropriate)   02/10/18 1330   Bed Mobility PT LTG   Bed Mobility PT LTG, Date Established 02/10/18   Bed Mobility PT LTG, Time to Achieve 2 wks   Bed Mobility PT LTG, Activity Type supine to sit/sit to supine   Bed Mobility PT LTG, [place] Level independent   Bed Mobility PT LTG, Date Goal Reviewed 02/10/18   Bed Mobility PT LTG, Outcome goal ongoing     Goal: Transfer Training Goal 1 LTG- PT  Outcome: Ongoing (interventions implemented as appropriate)   02/10/18 1330   Transfer Training PT LTG   Transfer Training PT LTG, Date Established 02/10/18   Transfer Training PT LTG, Time to Achieve 2 wks   Transfer Training PT LTG, Activity Type sit to stand/stand to sit   Transfer Training PT LTG, [place] Level conditional independence   Transfer Training PT LTG, Assist Device walker, rolling   Transfer Training PT LTG, Date Goal Reviewed 02/10/18   Transfer Training PT LTG, Outcome goal ongoing     Goal: Gait Training Goal LTG- PT  Outcome: Ongoing (interventions implemented as appropriate)   02/10/18 1330   Gait Training PT LTG   Gait Training Goal PT LTG, Date Established 02/10/18   Gait Training Goal PT LTG, Time to Achieve 2 wks   Gait Training Goal PT LTG, [place] Level conditional independence   Gait Training Goal PT LTG, Assist Device walker, rolling   Gait Training Goal PT LTG, Distance to Achieve 400 feet   Gait Training Goal PT LTG, Date Goal Reviewed 02/10/18   Gait Training Goal PT LTG, Outcome goal ongoing     Goal: Stair  Training Goal LTG- PT  Outcome: Ongoing (interventions implemented as appropriate)   02/10/18 1330   Stair Training PT LTG   Stair Training Goal PT LTG, Date Established 02/10/18   Stair Training Goal PT LTG, Time to Achieve 2 wks   Stair Training Goal PT LTG, Number of Steps 15   Stair Training Goal PT LTG, [place] Level contact guard assist   Stair Training Goal PT LTG, Date Goal Reviewed 02/10/18   Stair Training Goal PT LTG, Outcome goal ongoing

## 2018-02-11 NOTE — PROGRESS NOTES
[facility] Medicine Services  PROGRESS NOTE    Patient Name: Janna Muñiz  : 1955  MRN: [id number]    Date of Admission: 2018  Length of Stay: 4  Primary Care Physician: Navjot Harris MD    Subjective   Subjective     CC:  Medical managment    HPI:  Patient feels poorly today, aches all over and hot flashes. Coughing. Pain is okay.    Review of Systems  Gen- + fevers, chills  CV- No chest pain, palpitations  Resp- + cough, dyspnea  GI- No N/V/D, abd pain    Otherwise ROS is negative except as mentioned in the HPI.    Objective   Objective     Vital Signs:   Temp:  [98 °F (36.7 °C)-98.5 °F (36.9 °C)] 98 °F (36.7 °C)  Heart Rate:  [] 96  Resp:  [14-18] 18  BP: ()/(46-61) 96/56        Physical Exam:  Constitutional: No acute distress, awake, alert, appears anxious and shaky  HENT: NCAT, mucous membranes moist  Respiratory: Clear to auscultation bilaterally, respiratory effort normal, bilateral right sided chest tubes in place  Cardiovascular: RRR, no murmurs, rubs, or gallops, palpable pedal pulses bilaterally  Gastrointestinal: Positive bowel sounds, soft, nontender, nondistended  Musculoskeletal: No bilateral ankle edema  Psychiatric: Appropriate affect, cooperative  Neurologic: Oriented x 3, strength symmetric in all extremities, Cranial Nerves grossly intact to confrontation, speech clear  Skin: No rashes    Results Reviewed:  I have personally reviewed current lab, radiology, and data and agree.      Results from last 7 days  Lab Units 02/10/18  0329 18  0704   WBC 10*3/mm3 6.17 8.63   HEMOGLOBIN g/dL 10.8* 12.8   HEMATOCRIT % 31.0* 37.3   PLATELETS 10*3/mm3 162 166       Results from last 7 days  Lab Units 02/10/18  0329 18  0440 18  0704   SODIUM mmol/L 135 135 134   POTASSIUM mmol/L 4.3 5.0 3.9   CHLORIDE mmol/L 101 102 100   CO2 mmol/L 29.0 30.0 26.0   BUN mg/dL 16 19 13   CREATININE mg/dL 0.80 1.00 0.80   GLUCOSE mg/dL 102* 110* 121*    CALCIUM mg/dL 8.2* 9.2 8.7     Estimated Creatinine Clearance: 68.8 mL/min (by C-G formula based on Cr of 0.8).  No results found for: BNP  No results found for: PHART    Microbiology Results Abnormal     Procedure Component Value - Date/Time    Influenza A & B, RT PCR - Swab, Nasopharynx [[id number]]  (Normal) Collected:  02/11/18 1045    Lab Status:  Final result Specimen:  Swab from Nasopharynx Updated:  02/11/18 1157     Influenza A PCR Not Detected     Influenza B PCR Not Detected          Imaging Results (last 24 hours)     Procedure Component Value Units Date/Time    XR Chest 1 View [[id number]] Collected:  02/10/18 1305     Updated:  02/10/18 1431    Narrative:          EXAMINATION: XR CHEST 1 VW - 02/10/2018     INDICATION: Respiratory failure; R91.1-Solitary pulmonary nodule;  R91.8-Other nonspecific abnormal finding of lung field.     COMPARISON: 02/09/2018.     FINDINGS: Two right chest tubes remain in position. There is right upper  lobe airspace disease. There is subcutaneous air. There is, however, no  direct evidence of pneumothorax. Cardiac silhouette is normal and the  left lung demonstrates no acute process.       Impression:       There is patchy airspace disease in the right upper lobe.  There has been no change when compared with 02/09/2018.     DICTATED:     02/10/2018  EDITED    :     02/10/2018      This report was finalized on 2/10/2018 2:28 PM by Dr. Mahesh Narayan MD.       XR Chest 1 View [[id number]] Updated:  02/11/18 1110             I have reviewed the medications.    Assessment/Plan   Assessment / Plan     Hospital Problem List     * (Principal)Right upper lobe lung nodule/mass. Noncaseating granuloma on pathology    Overview Signed 2/1/2018 11:50 AM by ANASTASIA Flowers     Added automatically from request for surgery [id number]         S/P video assisted right thoracotomy and wedge resection of right upper lobe nodule/mass. 2/7/18             Brief Hospital Course to date:  Janna  BRADLEY Muñiz is a 62 y.o. female admitted 2/7 for RUL lung nodule. She underwent VATS w/wedge resection of nodule on 2/7 with Dr. Lopez. Pathology revealed non-caseating granuloma.     Assessment & Plan:  1. RUL nodule s/p VATs:  - path revealed non-caseating granuloma  - further management of chest tubes per CTS  - pain control with pain pump  - nebs, pulmonary toilet    2. Fever:  - check flu PCR- negative  - on Augmentin per CTS  - sputum cx obtained and pending  - if she becomes febrile again would get blood cultures and check UA    3. Anxiety:  - severe, continue home Wellbutrin and pristiq    4. Generalized weakness:  - would consider having PT/OT assess her once CT removed    DVT Prophylaxis:  Heparin    CODE STATUS: Full Code    Disposition: I expect the patient to be discharged per CT sudha Pantoja,   02/11/18  1:04 PM    \

## 2018-02-11 NOTE — THERAPY TREATMENT NOTE
Acute Care - Physical Therapy Treatment Note  [facility]     Patient Name: Janna Muñiz  : 1955  MRN: [id number]  Today's Date: 2018  Onset of Illness/Injury or Date of Surgery Date: 18  Date of Referral to PT: 02/10/18  Referring Physician: MD Lu    Admit Date: 2018    Visit Dx:    ICD-10-CM ICD-9-CM   1. Impaired functional mobility, balance, gait, and endurance Z74.09 V49.89   2. Lung nodule R91.1 793.11   3. Mass of upper lobe of right lung R91.8 786.6     Patient Active Problem List   Diagnosis   • Fibromyalgia   • Right upper lobe lung nodule/mass. Noncaseating granuloma on pathology   • Right upper lobe Lung nodule. Noncaseating granuloma on pathology   • S/P video assisted right thoracotomy and wedge resection of right upper lobe nodule/mass. 18               Adult Rehabilitation Note       18 1045          Rehab Assessment/Intervention    Discipline physical therapist  -      Document Type therapy note (daily note)  -      Subjective Information no complaints;agree to therapy  -      Patient Effort, Rehab Treatment good  -SH      Symptoms Noted During/After Treatment fatigue;increased pain  -      Precautions/Limitations oxygen therapy device and L/min;other (see comments)   R chest tube and thoracic epidural catheter  -      Recorded by [SH] Elda Short, PT      Pain Assessment    Pain Assessment Bishop-[place] FACES  -      Bishop-[place] FACES Pain Rating 0  -SH      Pain Score 4  -SH      Post Pain Score 4  -SH      Pain Type Acute pain  -      Pain Location Leg  -      Pain Orientation Left;Right  -      Pain Intervention(s) Repositioned;Ambulation/increased activity  -      Response to Interventions tolerated  -      Recorded by [SH] Elda Short, PT      Cognitive Assessment/Intervention    Current Cognitive/Communication Assessment functional  -      Orientation Status oriented x 4  -SH      Follows Commands/Answers Questions  able to follow single-step instructions;100% of the time  -      Personal Safety mild impairment  -      Personal Safety Interventions gait belt;nonskid shoes/slippers when out of bed  -      Recorded by [] Elda Short, PT      Bed Mobility, Assessment/Treatment    Bed Mobility, Comment Pt sitting in bedside chair.  -      Recorded by [] Elda Short, PT      Transfer Assessment/Treatment    Transfers, Sit-Stand [place] contact guard assist;verbal cues required  -      Transfers, Stand-Sit [place] verbal cues required;contact guard assist  -      Transfers, Sit-Stand-Sit, Assist Device --   none  -      Toilet Transfer, [place] contact guard assist  -      Toilet Transfer, Assistive Device bedside commode without drop arms  -      Transfer, Safety Issues step length decreased  -      Transfer, Impairments strength decreased  -      Transfer, Comment Vc to reach back for commode or recliner before sitting.  -      Recorded by [] Elda Short, PT      Gait Assessment/Treatment    Gait, [place] Level minimum assist (75% patient effort)  -      Gait, Assistive Device --   HHA on L side  -      Gait, Distance (Feet) 46  -      Gait, Gait Pattern Analysis swing-to gait  -      Gait, Gait Deviations bilateral:;[place] decreased;decreased heel strike;forward flexed posture;step length decreased  -      Gait, Safety Issues step length decreased;supplemental O2  -      Gait, Impairments sensation decreased;pain  -      Gait, Comment Pt would benefit from RW. Pt needed L UE support and unsteady. Slow short steps. Pt declined use of RW this session.  -      Recorded by [] Elda Short, PT      Therapy Exercises    Bilateral Lower Extremities AROM:;10 reps;sitting;ankle pumps/circles  -      Recorded by [] Elda Short, PT      Positioning and Restraints    Pre-Treatment Position sitting in chair/recliner  -      Post Treatment  Position chair  -SH      In Chair reclined;call light within reach;encouraged to call for assist  -SH      Recorded by [SH] Elda Short, PT        User Key  (r) = Recorded By, (t) = Taken By, (c) = Cosigned By    Initials Name Effective Dates     Eldaeric Phillipsd, PT 06/19/15 -                 IP PT Goals       02/10/18 1330          Bed Mobility PT LTG    Bed Mobility PT LTG, Date Established 02/10/18  -KR      Bed Mobility PT LTG, Time to Achieve 2 wks  -KR      Bed Mobility PT LTG, Activity Type supine to sit/sit to supine  -KR      Bed Mobility PT LTG, [place] Level independent  -KR      Bed Mobility PT LTG, Date Goal Reviewed 02/10/18  -KR      Bed Mobility PT LTG, Outcome goal ongoing  -KR      Transfer Training PT LTG    Transfer Training PT LTG, Date Established 02/10/18  -KR      Transfer Training PT LTG, Time to Achieve 2 wks  -KR      Transfer Training PT LTG, Activity Type sit to stand/stand to sit  -KR      Transfer Training PT LTG, [place] Level conditional independence  -KR      Transfer Training PT LTG, Assist Device walker, rolling  -KR      Transfer Training PT  LTG, Date Goal Reviewed 02/10/18  -KR      Transfer Training PT LTG, Outcome goal ongoing  -KR      Gait Training PT LTG    Gait Training Goal PT LTG, Date Established 02/10/18  -KR      Gait Training Goal PT LTG, Time to Achieve 2 wks  -KR      Gait Training Goal PT LTG, [place] Level conditional independence  -KR      Gait Training Goal PT LTG, Assist Device walker, rolling  -KR      Gait Training Goal PT LTG, Distance to Achieve 400 feet  -KR      Gait Training Goal PT LTG, Date Goal Reviewed 02/10/18  -KR      Gait Training Goal PT LTG, Outcome goal ongoing  -KR      Stair Training PT LTG    Stair Training Goal PT LTG, Date Established 02/10/18  -KR      Stair Training Goal PT LTG, Time to Achieve 2 wks  -KR      Stair Training Goal PT LTG, Number of Steps 15  -KR      Stair Training Goal PT LTG, [place] Level  contact guard assist  -KR      Stair Training Goal PT LTG, Date Goal Reviewed 02/10/18  -KR      Stair Training Goal PT LTG, Outcome goal ongoing  -KR        User Key  (r) = Recorded By, (t) = Taken By, (c) = Cosigned By    Initials Name Provider Type    KAYKAY Patino, PT Physical Therapist          Physical Therapy Education     Title: PT OT SLP Therapies (Done)     Topic: Physical Therapy (Done)     Point: Mobility training (Done)    Learning Progress Summary    Learner Readiness Method Response Comment Documented by Status   Patient Acceptance E VU Elevate B LE and ankle pumps to address B LE edema  02/11/18 1127 Done    Acceptance E NR  KR 02/10/18 1329 Active               Point: Home exercise program (Done)    Learning Progress Summary    Learner Readiness Method Response Comment Documented by Status   Patient Acceptance E VU Elevate B LE and ankle pumps to address B LE edema  02/11/18 1127 Done               Point: Body mechanics (Done)    Learning Progress Summary    Learner Readiness Method Response Comment Documented by Status   Patient Acceptance E VU Elevate B LE and ankle pumps to address B LE edema  02/11/18 1127 Done    Acceptance E NR  KR 02/10/18 1329 Active               Point: Precautions (Done)    Learning Progress Summary    Learner Readiness Method Response Comment Documented by Status   Patient Acceptance E VU Elevate B LE and ankle pumps to address B LE edema  02/11/18 1127 Done    Acceptance E NR  KR 02/10/18 1329 Active                      User Key     Initials Effective Dates Name Provider Type Discipline     06/19/15 -  Elda Short, PT Physical Therapist PT     09/25/17 -  Lavinia Patino PT Physical Therapist PT                    PT Recommendation and Plan  Anticipated Discharge Disposition: home with home health, home with 24/7 care  PT Frequency: daily  Plan of Care Review  Plan Of Care Reviewed With: patient  Progress: improving  Outcome Summary/Follow up Plan:  Pt ambulated 46' with HHA on o2.          Outcome Measures       02/11/18 1045 02/10/18 1137       How much help from another person do you currently need...    Turning from your back to your side while in flat bed without using bedrails? 3  -SH 3  -KR     Moving from lying on back to sitting on the side of a flat bed without bedrails? 3  -SH 3  -KR     Moving to and from a bed to a chair (including a wheelchair)? 3  -SH 3  -KR     Standing up from a chair using your arms (e.g., wheelchair, bedside chair)? 3  -SH 3  -KR     Climbing 3-5 steps with a railing? 2  -SH 2  -KR     To walk in hospital room? 3  -SH 3  -KR     AM-PAC 6 Clicks Score 17  - 17  -KR     Functional Assessment    Outcome Measure Options AM-PAC 6 Clicks Basic Mobility (PT)  -SH AM-PAC 6 Clicks Basic Mobility (PT)  -KR       User Key  (r) = Recorded By, (t) = Taken By, (c) = Cosigned By    Initials Name Provider Type     Elda Shotr, PT Physical Therapist    KR Lavinia Patino PT Physical Therapist           Time Calculation:         PT Charges       02/11/18 1129          Time Calculation    Start Time 1045  -SH      PT Received On 02/11/18  -      PT Goal Re-Cert Due Date 02/20/18  -      Time Calculation- PT    Total Timed Code Minutes- PT 30 minute(s)  -        User Key  (r) = Recorded By, (t) = Taken By, (c) = Cosigned By    Initials Name Provider Type     Elda Short, PT Physical Therapist          Therapy Charges for Today     Code Description Service Date Service Provider Modifiers Qty    49770156189 HC PT THER PROC EA 15 MIN 2/11/2018 Elda Short, PT GP 1    11514619854 HC GAIT TRAINING EA 15 MIN 2/11/2018 Elda Short, PT GP 1          PT G-Codes  Outcome Measure Options: AM-PAC 6 Clicks Basic Mobility (PT)    Elda Short, PT  2/11/2018

## 2018-02-11 NOTE — PROGRESS NOTES
Cardiothoracic Surgery Progress Note      POD # 4 s/p Right upper lobe wedge resection via thoracotomy       LOS: 4 days      Subjective:  Some pain  Coughing all night     Objective:  Vital Signs  Temp:  [98 °F (36.7 °C)-98.5 °F (36.9 °C)] 98 °F (36.7 °C)  Heart Rate:  [] 96  Resp:  [14-18] 18  BP: ()/(45-61) 96/56    Physical Exam:   General Appearance: alert, appears stated age and cooperative   Lungs: clear to auscultation, respirations regular, respirations even and respirations unlabored   Heart: regular rhythm & normal rate, normal S1, S2 and no murmur, no bora, no rub   Skin: Incision c/d/i   CT with no air leak 50 ml drainage 24 hours  Results:    Results from last 7 days  Lab Units 02/10/18  0329   WBC 10*3/mm3 6.17   HEMOGLOBIN g/dL 10.8*   HEMATOCRIT % 31.0*   PLATELETS 10*3/mm3 162       Results from last 7 days  Lab Units 02/10/18  0329   SODIUM mmol/L 135   POTASSIUM mmol/L 4.3   CHLORIDE mmol/L 101   CO2 mmol/L 29.0   BUN mg/dL 16   CREATININE mg/dL 0.80   GLUCOSE mg/dL 102*   CALCIUM mg/dL 8.2*         Assessment:  POD # 4 s/p Right upper lobe wedge resection via thoracotomy, slow progress  She is weaker today.  She has a persistent cough, flu test was ordered by the hospitalist    Plan:  Chest x-ray pending  Pulmonary toilet  Ambulate  D/C chest tube   D/C pain catheter

## 2018-02-12 ENCOUNTER — APPOINTMENT (OUTPATIENT)
Dept: GENERAL RADIOLOGY | Facility: HOSPITAL | Age: 63
End: 2018-02-12

## 2018-02-12 PROCEDURE — 97116 GAIT TRAINING THERAPY: CPT

## 2018-02-12 PROCEDURE — 25010000002 HEPARIN (PORCINE) PER 1000 UNITS: Performed by: PHYSICIAN ASSISTANT

## 2018-02-12 PROCEDURE — 25010000002 ONDANSETRON PER 1 MG: Performed by: PHYSICIAN ASSISTANT

## 2018-02-12 PROCEDURE — 99232 SBSQ HOSP IP/OBS MODERATE 35: CPT | Performed by: NURSE PRACTITIONER

## 2018-02-12 PROCEDURE — 94799 UNLISTED PULMONARY SVC/PX: CPT

## 2018-02-12 PROCEDURE — 94760 N-INVAS EAR/PLS OXIMETRY 1: CPT

## 2018-02-12 PROCEDURE — 99024 POSTOP FOLLOW-UP VISIT: CPT | Performed by: THORACIC SURGERY (CARDIOTHORACIC VASCULAR SURGERY)

## 2018-02-12 PROCEDURE — 25010000002 PROMETHAZINE PER 50 MG: Performed by: NURSE ANESTHETIST, CERTIFIED REGISTERED

## 2018-02-12 PROCEDURE — 71045 X-RAY EXAM CHEST 1 VIEW: CPT

## 2018-02-12 PROCEDURE — 97110 THERAPEUTIC EXERCISES: CPT

## 2018-02-12 RX ADMIN — AMOXICILLIN AND CLAVULANATE POTASSIUM 1 TABLET: 875; 125 TABLET, FILM COATED ORAL at 05:18

## 2018-02-12 RX ADMIN — GUAIFENESIN 1200 MG: 600 TABLET, EXTENDED RELEASE ORAL at 20:09

## 2018-02-12 RX ADMIN — IPRATROPIUM BROMIDE AND ALBUTEROL SULFATE 3 ML: .5; 3 SOLUTION RESPIRATORY (INHALATION) at 19:22

## 2018-02-12 RX ADMIN — DESVENLAFAXINE SUCCINATE 50 MG: 50 TABLET, EXTENDED RELEASE ORAL at 09:30

## 2018-02-12 RX ADMIN — PROMETHAZINE HYDROCHLORIDE 6.25 MG: 25 INJECTION INTRAMUSCULAR; INTRAVENOUS at 09:28

## 2018-02-12 RX ADMIN — HYDROCODONE BITARTRATE AND ACETAMINOPHEN 1 TABLET: 7.5; 325 TABLET ORAL at 09:30

## 2018-02-12 RX ADMIN — ONDANSETRON HYDROCHLORIDE 4 MG: 2 INJECTION, SOLUTION INTRAMUSCULAR; INTRAVENOUS at 20:09

## 2018-02-12 RX ADMIN — ONDANSETRON HYDROCHLORIDE 4 MG: 2 INJECTION, SOLUTION INTRAMUSCULAR; INTRAVENOUS at 00:14

## 2018-02-12 RX ADMIN — HEPARIN SODIUM 5000 UNITS: 5000 INJECTION, SOLUTION INTRAVENOUS; SUBCUTANEOUS at 20:09

## 2018-02-12 RX ADMIN — HYDROCODONE BITARTRATE AND ACETAMINOPHEN 1 TABLET: 7.5; 325 TABLET ORAL at 20:09

## 2018-02-12 RX ADMIN — IPRATROPIUM BROMIDE AND ALBUTEROL SULFATE 3 ML: .5; 3 SOLUTION RESPIRATORY (INHALATION) at 03:50

## 2018-02-12 RX ADMIN — Medication 2 TABLET: at 09:17

## 2018-02-12 RX ADMIN — HYDROCODONE POLISTIREX AND CHLORPHENIRAMINE POLISTIREX 2.5 ML: 10; 8 SUSPENSION, EXTENDED RELEASE ORAL at 09:29

## 2018-02-12 RX ADMIN — POLYETHYLENE GLYCOL 3350 17 G: 17 POWDER, FOR SOLUTION ORAL at 12:34

## 2018-02-12 RX ADMIN — AMOXICILLIN AND CLAVULANATE POTASSIUM 1 TABLET: 875; 125 TABLET, FILM COATED ORAL at 18:33

## 2018-02-12 RX ADMIN — HEPARIN SODIUM 5000 UNITS: 5000 INJECTION, SOLUTION INTRAVENOUS; SUBCUTANEOUS at 09:29

## 2018-02-12 RX ADMIN — IPRATROPIUM BROMIDE AND ALBUTEROL SULFATE 3 ML: .5; 3 SOLUTION RESPIRATORY (INHALATION) at 10:39

## 2018-02-12 RX ADMIN — OXYCODONE AND ACETAMINOPHEN 1 TABLET: 5; 325 TABLET ORAL at 00:14

## 2018-02-12 RX ADMIN — GUAIFENESIN 1200 MG: 600 TABLET, EXTENDED RELEASE ORAL at 09:17

## 2018-02-12 RX ADMIN — IPRATROPIUM BROMIDE AND ALBUTEROL SULFATE 3 ML: .5; 3 SOLUTION RESPIRATORY (INHALATION) at 06:57

## 2018-02-12 RX ADMIN — BUPROPION HYDROCHLORIDE 300 MG: 150 TABLET, FILM COATED, EXTENDED RELEASE ORAL at 09:17

## 2018-02-12 RX ADMIN — IPRATROPIUM BROMIDE AND ALBUTEROL SULFATE 3 ML: .5; 3 SOLUTION RESPIRATORY (INHALATION) at 16:12

## 2018-02-12 NOTE — PROGRESS NOTES
Cardiothoracic Surgery Progress Note      POD # 5 s/p Right upper lobe wedge resection via thoracotomy       LOS: 5 days      Subjective:  Afebrile. Persistent cough.  Reports fatigue. Denies SOA. Ambulating. No other complaints.    Objective:  Vital Signs  Temp:  [97.9 °F (36.6 °C)-98.6 °F (37 °C)] 97.9 °F (36.6 °C)  Heart Rate:  [] 85  Resp:  [16-18] 18  BP: ()/(39-60) 113/39    Physical Exam:   General Appearance: alert, appears stated age and cooperative   Lungs: CTAB, rhonchi   Heart: regular rhythm & normal rate, normal S1, S2 and no murmur, no bora, no rub   Skin: Incision c/d/i   CT with no air leak 90 ml drainage 24 hours. No air leak.  Results:    Results from last 7 days  Lab Units 02/10/18  0329   WBC 10*3/mm3 6.17   HEMOGLOBIN g/dL 10.8*   HEMATOCRIT % 31.0*   PLATELETS 10*3/mm3 162       Results from last 7 days  Lab Units 02/10/18  0329   SODIUM mmol/L 135   POTASSIUM mmol/L 4.3   CHLORIDE mmol/L 101   CO2 mmol/L 29.0   BUN mg/dL 16   CREATININE mg/dL 0.80   GLUCOSE mg/dL 102*   CALCIUM mg/dL 8.2*         Assessment:  POD # 5 s/p Right upper lobe wedge resection via thoracotomy, slow progress    Plan:  Pulmonary toilet  Ambulate  D/C chest tube   D/C pain catheter   Tussionex prn for cough      Status as above.  Agree with chest tube removal.  Can probably discharge in a.m.I have reviewed, verified, and confirmed the above history and current status.  I have examined the patient and confirmed the above physical findings.    Mir Lopez MD  CTSurgery  02/12/18   8:48 AM

## 2018-02-12 NOTE — PLAN OF CARE
Problem: Patient Care Overview (Adult)  Goal: Plan of Care Review  Outcome: Ongoing (interventions implemented as appropriate)   02/11/18 1128 02/11/18 2000 02/12/18 0301   Coping/Psychosocial Response Interventions   Plan Of Care Reviewed With --  patient;daughter --    Patient Care Overview   Progress improving --  --    Outcome Evaluation   Outcome Summary/Follow up Plan --  --  Patient rested on and off during night. Patient reported pain during night and recieved PO pain medication. Patient is alert and oriented on 4L O2 nasal canula. Vital signs remain stable. Will continue to monitor.      Goal: Adult Individualization and Mutuality  Outcome: Ongoing (interventions implemented as appropriate)    Goal: Discharge Needs Assessment  Outcome: Ongoing (interventions implemented as appropriate)      Problem: Lung Surgery (via Thoracotomy) (Adult)  Goal: Signs and Symptoms of Listed Potential Problems Will be Absent or Manageable (Lung Surgery)  Outcome: Ongoing (interventions implemented as appropriate)

## 2018-02-12 NOTE — PROGRESS NOTES
[facility]    Acute pain service Inpatient Progress Note    Patient Name: Janna Muñiz  :  1955  MRN:  [id number]        Acute Pain  Service Inpatient Progress Note:    Analgesia:Excellent  LOC: alert and awake  Resp Status: room air  Cardiac: VS stable  Side Effects:None  Catheter Site:clean, dressing intact and dry  Cath type: Epidural cath(MOOG pump)  Catheter Plan:Catheter to remain Insitu and Continue catheter infusion rate unchanged  Comments: Epidural is infusion at 1ml/hr.  Patient has good pain management.  We will remove the epidural when the chest tubes are removed which I anticipate to be today.

## 2018-02-12 NOTE — PLAN OF CARE
Problem: Patient Care Overview (Adult)  Goal: Plan of Care Review  Outcome: Ongoing (interventions implemented as appropriate)   02/12/18 1746   Coping/Psychosocial Response Interventions   Plan Of Care Reviewed With patient   Patient Care Overview   Progress improving       Problem: Lung Surgery (via Thoracotomy) (Adult)  Goal: Signs and Symptoms of Listed Potential Problems Will be Absent or Manageable (Lung Surgery)  Outcome: Ongoing (interventions implemented as appropriate)   02/12/18 1746   Lung Surgery (via Thoracotomy)   Problems Assessed (Lung Surgery) all   Problems Present (Lung Surgery) pain;situational response

## 2018-02-12 NOTE — PLAN OF CARE
Problem: Patient Care Overview (Adult)  Goal: Plan of Care Review  Outcome: Ongoing (interventions implemented as appropriate)   02/12/18 1119   Coping/Psychosocial Response Interventions   Plan Of Care Reviewed With patient   Patient Care Overview   Progress progress toward functional goals as expected   Outcome Evaluation   Outcome Summary/Follow up Plan patient with increased fatigue and weakness limiting gait distance and tolerance to all activity.       Problem: Inpatient Physical Therapy  Goal: Bed Mobility Goal LTG- PT  Outcome: Ongoing (interventions implemented as appropriate)   02/10/18 1330 02/12/18 1119   Bed Mobility PT LTG   Bed Mobility PT LTG, Date Established 02/10/18 --    Bed Mobility PT LTG, Time to Achieve 2 wks --    Bed Mobility PT LTG, Activity Type supine to sit/sit to supine --    Bed Mobility PT LTG, [place] Level independent --    Bed Mobility PT LTG, Date Goal Reviewed --  02/12/18   Bed Mobility PT LTG, Outcome --  goal ongoing     Goal: Transfer Training Goal 1 LTG- PT  Outcome: Ongoing (interventions implemented as appropriate)   02/10/18 1330 02/12/18 1119   Transfer Training PT LTG   Transfer Training PT LTG, Date Established 02/10/18 --    Transfer Training PT LTG, Time to Achieve 2 wks --    Transfer Training PT LTG, Activity Type sit to stand/stand to sit --    Transfer Training PT LTG, [place] Level conditional independence --    Transfer Training PT LTG, Assist Device walker, rolling --    Transfer Training PT LTG, Date Goal Reviewed --  02/12/18   Transfer Training PT LTG, Outcome --  goal ongoing     Goal: Gait Training Goal LTG- PT  Outcome: Ongoing (interventions implemented as appropriate)   02/10/18 1330 02/12/18 1119   Gait Training PT LTG   Gait Training Goal PT LTG, Date Established 02/10/18 --    Gait Training Goal PT LTG, Time to Achieve 2 wks --    Gait Training Goal PT LTG, [place] Level conditional independence --    Gait Training Goal PT LTG,  Assist Device walker, rolling --    Gait Training Goal PT LTG, Distance to Achieve 400 feet --    Gait Training Goal PT LTG, Date Goal Reviewed --  02/12/18   Gait Training Goal PT LTG, Outcome --  goal ongoing     Goal: Stair Training Goal LTG- PT  Outcome: Ongoing (interventions implemented as appropriate)   02/10/18 1330 02/12/18 1119   Stair Training PT LTG   Stair Training Goal PT LTG, Date Established 02/10/18 --    Stair Training Goal PT LTG, Time to Achieve 2 wks --    Stair Training Goal PT LTG, Number of Steps 15 --    Stair Training Goal PT LTG, [place] Level contact guard assist --    Stair Training Goal PT LTG, Date Goal Reviewed --  02/12/18   Stair Training Goal PT LTG, Outcome --  goal ongoing

## 2018-02-12 NOTE — PROGRESS NOTES
[facility] Medicine Services  PROGRESS NOTE    Patient Name: Janna Muñiz  : 1955  MRN: [id number]    Date of Admission: 2018  Length of Stay: 5  Primary Care Physician: Navjot Harris MD    Subjective   Subjective     CC:  Medical managment    HPI:  Patient feels better today. CT removed not long ago. Pain controlled. No soa. Some mild non productive cough    Review of Systems  Gen- + fevers, chills  CV- No chest pain, palpitations  Resp- + cough, dyspnea  GI- No N/V/D, abd pain    Otherwise ROS is negative except as mentioned in the HPI.    Objective   Objective     Vital Signs:   Temp:  [97.9 °F (36.6 °C)-98.6 °F (37 °C)] 98.1 °F (36.7 °C)  Heart Rate:  [] 88  Resp:  [16-18] 18  BP: (100-120)/(39-60) 120/51        Physical Exam:  Constitutional: No acute distress, awake, alert, appears anxious and shaky  HENT: NCAT, mucous membranes moist  Respiratory: Clear to auscultation bilaterally, respiratory effort normal,bandage on right side/ back cdi  Cardiovascular: RRR, no murmurs, rubs, or gallops, palpable pedal pulses bilaterally  Gastrointestinal: Positive bowel sounds, soft, nontender, nondistended  Musculoskeletal: No bilateral ankle edema  Psychiatric: Appropriate affect, cooperative  Neurologic: Oriented x 3, strength symmetric in all extremities, Cranial Nerves grossly intact to confrontation, speech clear  Skin: No rashes    Results Reviewed:  I have personally reviewed current lab, radiology, and data and agree.      Results from last 7 days  Lab Units 02/10/18  0329 18  0704   WBC 10*3/mm3 6.17 8.63   HEMOGLOBIN g/dL 10.8* 12.8   HEMATOCRIT % 31.0* 37.3   PLATELETS 10*3/mm3 162 166       Results from last 7 days  Lab Units 02/10/18  0329 18  0440 18  0704   SODIUM mmol/L 135 135 134   POTASSIUM mmol/L 4.3 5.0 3.9   CHLORIDE mmol/L 101 102 100   CO2 mmol/L 29.0 30.0 26.0   BUN mg/dL 16 19 13   CREATININE mg/dL 0.80 1.00 0.80   GLUCOSE  mg/dL 102* 110* 121*   CALCIUM mg/dL 8.2* 9.2 8.7     Estimated Creatinine Clearance: 68.1 mL/min (by C-G formula based on Cr of 0.8).  No results found for: BNP  No results found for: PHART    Microbiology Results Abnormal     Procedure Component Value - Date/Time    Influenza A & B, RT PCR - Swab, Nasopharynx [[id number]]  (Normal) Collected:  02/11/18 1045    Lab Status:  Final result Specimen:  Swab from Nasopharynx Updated:  02/11/18 1157     Influenza A PCR Not Detected     Influenza B PCR Not Detected          Imaging Results (last 24 hours)     Procedure Component Value Units Date/Time    XR Chest 1 View [[id number]] Collected:  02/11/18 1319     Updated:  02/11/18 1404    Narrative:          EXAMINATION: XR CHEST 1 VW - 02/11/2018     INDICATION:  R91.1-Solitary pulmonary nodule; R91.8-Other nonspecific  abnormal finding of lung field; Z74.09-Other reduced mobility. Post-op  exam.      COMPARISON: 02/10/2018.     FINDINGS: Portable chest reveals surgical staple line identified of the  right upper lung field. Slight volume loss on the right with chest tube  in place. Rib fracture identified of the right lateral ribs. There is no  definite pneumothorax. Mild increased markings seen within the right  upper lung field.           Impression:       Stable chest as above. No definite pneumothorax.     DICTATED:     02/11/2018  EDITED    :     02/11/2018      This report was finalized on 2/11/2018 2:02 PM by Dr. Anahy Cee MD.       XR Chest 1 View [[id number]] Collected:  02/12/18 0911     Updated:  02/12/18 1037    Narrative:       EXAMINATION: XR CHEST 1 VW- 02/12/2018     INDICATION: post op; Z74.09-Other reduced mobility; R91.1-Solitary  pulmonary nodule; R91.8-Other nonspecific abnormal finding of lung field        COMPARISON: 02/11/2018     FINDINGS: A right chest tube remains in position. There is patchy  airspace disease in the right upper lobe. There is no pneumothorax. The  cardiac silhouette is  normal as is the left lung.           Impression:       Stable patchy right upper lobe airspace disease. There is no  pneumothorax.     D:  02/12/2018  E:  02/12/2018     This report was finalized on 2/12/2018 10:34 AM by Dr. Mahesh Narayan MD.                I have reviewed the medications.    Assessment/Plan   Assessment / Plan     Hospital Problem List     * (Principal)Right upper lobe lung nodule/mass. Noncaseating granuloma on pathology    Overview Signed 2/1/2018 11:50 AM by ANASTASIA Flowers     Added automatically from request for surgery [id number]         S/P video assisted right thoracotomy and wedge resection of right upper lobe nodule/mass. 2/7/18             Brief Hospital Course to date:  Janna Muñiz is a 62 y.o. female admitted 2/7 for RUL lung nodule. She underwent VATS w/wedge resection of nodule on 2/7 with Dr. Lopez. Pathology revealed non-caseating granuloma. Requested hospitalist to follow for med mgmt    Assessment & Plan:  1. RUL nodule s/p VATs:  - path revealed non-caseating granuloma  - Right CTs removed today, will remove epidural in a few hours  - nebs, pulmonary toilet  - tussinex added    2. Fever:  - resolved  - check flu PCR- negative   - on Augmentin per CTS  - sputum cx obtained and pending  - if she becomes febrile again would get blood cultures and check UA    3. Anxiety:  - severe, continue home Wellbutrin and pristiq    4. Generalized weakness:  - PT/OT to see now that CT dc'd    5. Constipation  -- increase bowel regimen    DVT Prophylaxis:  Heparin    CODE STATUS: Full Code    Disposition: I expect the patient to be discharged per CT sudha Perera, APRN  02/12/18  10:37 AM    \

## 2018-02-12 NOTE — THERAPY TREATMENT NOTE
Acute Care - Physical Therapy Treatment Note  [facility]     Patient Name: Janna Muñiz  : 1955  MRN: [id number]  Today's Date: 2018  Onset of Illness/Injury or Date of Surgery Date: 18  Date of Referral to PT: 02/10/18  Referring Physician: MD Lu    Admit Date: 2018    Visit Dx:    ICD-10-CM ICD-9-CM   1. Impaired functional mobility, balance, gait, and endurance Z74.09 V49.89   2. Lung nodule R91.1 793.11   3. Mass of upper lobe of right lung R91.8 786.6     Patient Active Problem List   Diagnosis   • Fibromyalgia   • Right upper lobe lung nodule/mass. Noncaseating granuloma on pathology   • Right upper lobe Lung nodule. Noncaseating granuloma on pathology   • S/P video assisted right thoracotomy and wedge resection of right upper lobe nodule/mass. 18               Adult Rehabilitation Note       18 1024 18 1045       Rehab Assessment/Intervention    Discipline physical therapy assistant  -AS physical therapist  -     Document Type therapy note (daily note)  -AS therapy note (daily note)  -     Subjective Information agree to therapy;complains of;weakness  -AS no complaints;agree to therapy  -SH     Patient Effort, Rehab Treatment good  -AS good  -     Symptoms Noted During/After Treatment fatigue  -AS fatigue;increased pain  -     Precautions/Limitations fall precautions;oxygen therapy device and L/min;other (see comments)   thoracic epidural catheter  -AS oxygen therapy device and L/min;other (see comments)   R chest tube and thoracic epidural catheter  -     Recorded by [AS] Nikki Bello PTA [SH] Elda Short, PT     Pain Assessment    Pain Assessment 0-10  -AS [place] FACES  -SH     Bishop-[place] FACES Pain Rating  0  -SH     Pain Score 3  -AS 4  -SH     Post Pain Score 3  -AS 4  -SH     Pain Type Acute pain  -AS Acute pain  -     Pain Location Rib cage  -AS Leg  -SH     Pain Orientation Right;Left  -AS Left;Right  -SH     Pain  Intervention(s) Repositioned;Ambulation/increased activity  -AS Repositioned;Ambulation/increased activity  -SH     Response to Interventions tolerated  -AS tolerated  -SH     Recorded by [AS] Nikki Bello PTA [SH] Elda Short, PT     Cognitive Assessment/Intervention    Current Cognitive/Communication Assessment functional  -AS functional  -SH     Orientation Status oriented x 4  -AS oriented x 4  -SH     Follows Commands/Answers Questions 100% of the time;able to follow single-step instructions  -AS able to follow single-step instructions;100% of the time  -SH     Personal Safety mild impairment  -AS mild impairment  -SH     Personal Safety Interventions fall prevention program maintained;gait belt;nonskid shoes/slippers when out of bed;other (see comments)   exit alarm  -AS gait belt;nonskid shoes/slippers when out of bed  -SH     Recorded by [AS] Nikki Bello PTA [SH] Elda Short, PT     Bed Mobility, Assessment/Treatment    Bed Mobility, Comment UIC  -AS Pt sitting in bedside chair.  -SH     Recorded by [AS] Nikki Bello PTA [SH] Elda Short, PT     Transfer Assessment/Treatment    Transfers, Sit-Stand [place] verbal cues required;contact guard assist;1 person + 1 person to manage equipment  -AS contact guard assist;verbal cues required  -SH     Transfers, Stand-Sit [place] verbal cues required;contact guard assist;1 person + 1 person to manage equipment  -AS verbal cues required;contact guard assist  -SH     Transfers, Sit-Stand-Sit, Assist Device rolling walker  -AS --   none  -SH     Toilet Transfer, [place]  contact guard assist  -SH     Toilet Transfer, Assistive Device  bedside commode without drop arms  -SH     Transfer, Safety Issues  step length decreased  -SH     Transfer, Impairments strength decreased  -AS strength decreased  -SH     Transfer, Comment verbal cues for hand placement, patient lethargic and needed cues to keep eyes open throughout  session  -AS Vc to reach back for commode or recliner before sitting.  -SH     Recorded by [AS] Nikki Bello PTA [SH] Elda Short, PT     Gait Assessment/Treatment    Gait, [place] Level verbal cues required;minimum assist (75% patient effort);1 person + 1 person to manage equipment  -AS minimum assist (75% patient effort)  -     Gait, Assistive Device rolling walker  -AS --   HHA on L side  -     Gait, Distance (Feet) 35  -AS 46  -     Gait, Gait Pattern Analysis  swing-to gait  -     Gait, Gait Deviations west[place] decreased;step length decreased;forward flexed posture  -AS bilateral:;[place] decreased;decreased heel strike;forward flexed posture;step length decreased  -     Gait, Safety Issues step length decreased;supplemental O2  -AS step length decreased;supplemental O2  -     Gait, Impairments strength decreased;pain;impaired balance  -AS sensation decreased;pain  -     Gait, Comment patient needs verbal cues to stay inside walker, very slow pace with decreased step length.  -AS Pt would benefit from RW. Pt needed L UE support and unsteady. Slow short steps. Pt declined use of RW this session.  -SH     Recorded by [AS] Nikki Bello PTA [SH] Elda Short, LASHON     Therapy Exercises    Bilateral Lower Extremities AROM:;10 reps;sitting;ankle pumps/circles;hip flexion;20 reps;LAQ  -AS AROM:;10 reps;sitting;ankle pumps/circles  -SH     Recorded by [AS] Nikki Bello PTA [SH] Elda Short, LASHON     Positioning and Restraints    Pre-Treatment Position sitting in chair/recliner  -AS sitting in chair/recliner  -SH     Post Treatment Position chair  -AS chair  -SH     In Chair reclined;call light within reach;encouraged to call for assist;exit alarm on;with family/caregiver  -AS reclined;call light within reach;encouraged to call for assist  -SH     Recorded by [AS] Nikki Bello PTA [SH] Elda Short, PT       User Key  (r) = Recorded By, (t) = Taken By, (c) =  Cosigned By    Initials Name Effective Dates     Elda Short, PT 06/19/15 -     AS Nikki Bello, PTA 06/22/15 -                 IP PT Goals       02/12/18 1119 02/10/18 1330       Bed Mobility PT LTG    Bed Mobility PT LTG, Date Established  02/10/18  -KR     Bed Mobility PT LTG, Time to Achieve  2 wks  -KR     Bed Mobility PT LTG, Activity Type  supine to sit/sit to supine  -KR     Bed Mobility PT LTG, [place] Level  independent  -KR     Bed Mobility PT LTG, Date Goal Reviewed 02/12/18  -AS 02/10/18  -KR     Bed Mobility PT LTG, Outcome goal ongoing  -AS goal ongoing  -KR     Transfer Training PT LTG    Transfer Training PT LTG, Date Established  02/10/18  -KR     Transfer Training PT LTG, Time to Achieve  2 wks  -KR     Transfer Training PT LTG, Activity Type  sit to stand/stand to sit  -KR     Transfer Training PT LTG, [place] Level  conditional independence  -KR     Transfer Training PT LTG, Assist Device  walker, rolling  -KR     Transfer Training PT  LTG, Date Goal Reviewed 02/12/18  -AS 02/10/18  -KR     Transfer Training PT LTG, Outcome goal ongoing  -AS goal ongoing  -KR     Gait Training PT LTG    Gait Training Goal PT LTG, Date Established  02/10/18  -KR     Gait Training Goal PT LTG, Time to Achieve  2 wks  -KR     Gait Training Goal PT LTG, [place] Level  conditional independence  -KR     Gait Training Goal PT LTG, Assist Device  walker, rolling  -KR     Gait Training Goal PT LTG, Distance to Achieve  400 feet  -KR     Gait Training Goal PT LTG, Date Goal Reviewed 02/12/18  -AS 02/10/18  -KR     Gait Training Goal PT LTG, Outcome goal ongoing  -AS goal ongoing  -KR     Stair Training PT LTG    Stair Training Goal PT LTG, Date Established  02/10/18  -KR     Stair Training Goal PT LTG, Time to Achieve  2 wks  -KR     Stair Training Goal PT LTG, Number of Steps  15  -KR     Stair Training Goal PT LTG, [place] Level  contact guard assist  -KR     Stair Training Goal PT LTG,  Date Goal Reviewed 02/12/18  -AS 02/10/18  -KR     Stair Training Goal PT LTG, Outcome goal ongoing  -AS goal ongoing  -KR       User Key  (r) = Recorded By, (t) = Taken By, (c) = Cosigned By    Initials Name Provider Type    AS Nikki Bello, MARSHA Physical Therapy Assistant    KR Lavinia Patino, PT Physical Therapist          Physical Therapy Education     Title: PT OT SLP Therapies (Active)     Topic: Physical Therapy (Active)     Point: Mobility training (Active)    Learning Progress Summary    Learner Readiness Method Response Comment Documented by Status   Patient Acceptance E NR  AS 02/12/18 1118 Active    Acceptance E VU Elevate B LE and ankle pumps to address B LE edema  02/11/18 1127 Done    Acceptance E NR  KR 02/10/18 1329 Active               Point: Home exercise program (Active)    Learning Progress Summary    Learner Readiness Method Response Comment Documented by Status   Patient Acceptance E NR  AS 02/12/18 1118 Active    Acceptance E VU Elevate B LE and ankle pumps to address B LE edema  02/11/18 1127 Done               Point: Body mechanics (Active)    Learning Progress Summary    Learner Readiness Method Response Comment Documented by Status   Patient Acceptance E NR  AS 02/12/18 1118 Active    Acceptance E VU Elevate B LE and ankle pumps to address B LE edema  02/11/18 1127 Done    Acceptance E NR  KR 02/10/18 1329 Active               Point: Precautions (Active)    Learning Progress Summary    Learner Readiness Method Response Comment Documented by Status   Patient Acceptance E NR  AS 02/12/18 1118 Active    Acceptance E VU Elevate B LE and ankle pumps to address B LE edema  02/11/18 1127 Done    Acceptance E NR  KR 02/10/18 1329 Active                      User Key     Initials Effective Dates Name Provider Type Discipline     06/19/15 -  Elda Short, PT Physical Therapist PT    AS 06/22/15 -  Nikki Bello PTA Physical Therapy Assistant PT    KR 09/25/17 -  Lavinia BARCENAS  Sukumar, PT Physical Therapist PT                    PT Recommendation and Plan  Anticipated Discharge Disposition: home with home health, home with 24/7 care  PT Frequency: daily  Plan of Care Review  Plan Of Care Reviewed With: patient  Progress: progress toward functional goals as expected  Outcome Summary/Follow up Plan: patient with increased fatigue and weakness limiting gait distance and tolerance to all activity.          Outcome Measures       02/12/18 1024 02/11/18 1045 02/10/18 1137    How much help from another person do you currently need...    Turning from your back to your side while in flat bed without using bedrails? 3  -AS 3  -SH 3  -KR    Moving from lying on back to sitting on the side of a flat bed without bedrails? 3  -AS 3  -SH 3  -KR    Moving to and from a bed to a chair (including a wheelchair)? 3  -AS 3  -SH 3  -KR    Standing up from a chair using your arms (e.g., wheelchair, bedside chair)? 3  -AS 3  -SH 3  -KR    Climbing 3-5 steps with a railing? 2  -AS 2  -SH 2  -KR    To walk in hospital room? 3  -AS 3  -SH 3  -KR    AM-PAC 6 Clicks Score 17  -AS 17  -SH 17  -KR    Functional Assessment    Outcome Measure Options AM-PAC 6 Clicks Basic Mobility (PT)  -AS AM-PAC 6 Clicks Basic Mobility (PT)  -SH AM-PAC 6 Clicks Basic Mobility (PT)  -KR      User Key  (r) = Recorded By, (t) = Taken By, (c) = Cosigned By    Initials Name Provider Type    KELSEY Short, PT Physical Therapist    AS Nikki Bello PTA Physical Therapy Assistant    KAYKAY Patino PT Physical Therapist           Time Calculation:         PT Charges       02/12/18 1120          Time Calculation    Start Time 1024  -AS      PT Received On 02/12/18  -AS      PT Goal Re-Cert Due Date 02/20/18  -AS      Time Calculation- PT    Total Timed Code Minutes- PT 23 minute(s)  -AS        User Key  (r) = Recorded By, (t) = Taken By, (c) = Cosigned By    Initials Name Provider Type    AS Nikki Bello PTA Physical Therapy  Assistant          Therapy Charges for Today     Code Description Service Date Service Provider Modifiers Qty    53861979908 HC GAIT TRAINING EA 15 MIN 2/12/2018 Nikki Bello, PTA GP 1    94541039828 HC PT THER PROC EA 15 MIN 2/12/2018 Nikki Bello, MARSHA GP 1    14414374702 HC PT THER SUPP EA 15 MIN 2/12/2018 Nikki Bello, MARSHA GP 2          PT G-Codes  Outcome Measure Options: AM-PAC 6 Clicks Basic Mobility (PT)    Nikki Bello PTA  2/12/2018

## 2018-02-13 ENCOUNTER — APPOINTMENT (OUTPATIENT)
Dept: GENERAL RADIOLOGY | Facility: HOSPITAL | Age: 63
End: 2018-02-13

## 2018-02-13 PROCEDURE — 94640 AIRWAY INHALATION TREATMENT: CPT

## 2018-02-13 PROCEDURE — 25010000002 ONDANSETRON PER 1 MG: Performed by: PHYSICIAN ASSISTANT

## 2018-02-13 PROCEDURE — 94799 UNLISTED PULMONARY SVC/PX: CPT

## 2018-02-13 PROCEDURE — 99024 POSTOP FOLLOW-UP VISIT: CPT | Performed by: THORACIC SURGERY (CARDIOTHORACIC VASCULAR SURGERY)

## 2018-02-13 PROCEDURE — 99231 SBSQ HOSP IP/OBS SF/LOW 25: CPT | Performed by: NURSE PRACTITIONER

## 2018-02-13 PROCEDURE — 71045 X-RAY EXAM CHEST 1 VIEW: CPT

## 2018-02-13 PROCEDURE — 25010000002 HEPARIN (PORCINE) PER 1000 UNITS: Performed by: THORACIC SURGERY (CARDIOTHORACIC VASCULAR SURGERY)

## 2018-02-13 PROCEDURE — 25010000002 PROMETHAZINE PER 50 MG: Performed by: NURSE ANESTHETIST, CERTIFIED REGISTERED

## 2018-02-13 PROCEDURE — 94760 N-INVAS EAR/PLS OXIMETRY 1: CPT

## 2018-02-13 RX ORDER — HEPARIN SODIUM 5000 [USP'U]/ML
5000 INJECTION, SOLUTION INTRAVENOUS; SUBCUTANEOUS EVERY 12 HOURS SCHEDULED
Status: DISCONTINUED | OUTPATIENT
Start: 2018-02-13 | End: 2018-02-14 | Stop reason: HOSPADM

## 2018-02-13 RX ADMIN — ONDANSETRON HYDROCHLORIDE 4 MG: 2 INJECTION, SOLUTION INTRAMUSCULAR; INTRAVENOUS at 21:29

## 2018-02-13 RX ADMIN — OXYCODONE AND ACETAMINOPHEN 1 TABLET: 5; 325 TABLET ORAL at 00:54

## 2018-02-13 RX ADMIN — Medication 2 TABLET: at 09:40

## 2018-02-13 RX ADMIN — BUPROPION HYDROCHLORIDE 300 MG: 150 TABLET, FILM COATED, EXTENDED RELEASE ORAL at 09:42

## 2018-02-13 RX ADMIN — AMOXICILLIN AND CLAVULANATE POTASSIUM 1 TABLET: 875; 125 TABLET, FILM COATED ORAL at 05:25

## 2018-02-13 RX ADMIN — PROMETHAZINE HYDROCHLORIDE 6.25 MG: 25 INJECTION INTRAMUSCULAR; INTRAVENOUS at 00:54

## 2018-02-13 RX ADMIN — HYDROCODONE BITARTRATE AND ACETAMINOPHEN 1 TABLET: 7.5; 325 TABLET ORAL at 11:13

## 2018-02-13 RX ADMIN — HEPARIN SODIUM 5000 UNITS: 5000 INJECTION, SOLUTION INTRAVENOUS; SUBCUTANEOUS at 21:29

## 2018-02-13 RX ADMIN — IPRATROPIUM BROMIDE AND ALBUTEROL SULFATE 3 ML: .5; 3 SOLUTION RESPIRATORY (INHALATION) at 19:37

## 2018-02-13 RX ADMIN — POLYETHYLENE GLYCOL 3350 17 G: 17 POWDER, FOR SOLUTION ORAL at 09:41

## 2018-02-13 RX ADMIN — GUAIFENESIN 1200 MG: 600 TABLET, EXTENDED RELEASE ORAL at 09:48

## 2018-02-13 RX ADMIN — IPRATROPIUM BROMIDE AND ALBUTEROL SULFATE 3 ML: .5; 3 SOLUTION RESPIRATORY (INHALATION) at 03:39

## 2018-02-13 RX ADMIN — ONDANSETRON HYDROCHLORIDE 4 MG: 2 INJECTION, SOLUTION INTRAMUSCULAR; INTRAVENOUS at 11:13

## 2018-02-13 RX ADMIN — DESVENLAFAXINE SUCCINATE 50 MG: 50 TABLET, EXTENDED RELEASE ORAL at 09:48

## 2018-02-13 RX ADMIN — HYDROCODONE BITARTRATE AND ACETAMINOPHEN 1 TABLET: 7.5; 325 TABLET ORAL at 21:29

## 2018-02-13 RX ADMIN — AMOXICILLIN AND CLAVULANATE POTASSIUM 1 TABLET: 875; 125 TABLET, FILM COATED ORAL at 17:18

## 2018-02-13 RX ADMIN — IPRATROPIUM BROMIDE AND ALBUTEROL SULFATE 3 ML: .5; 3 SOLUTION RESPIRATORY (INHALATION) at 06:33

## 2018-02-13 RX ADMIN — GUAIFENESIN 1200 MG: 600 TABLET, EXTENDED RELEASE ORAL at 21:28

## 2018-02-13 RX ADMIN — IPRATROPIUM BROMIDE AND ALBUTEROL SULFATE 3 ML: .5; 3 SOLUTION RESPIRATORY (INHALATION) at 14:45

## 2018-02-13 RX ADMIN — IPRATROPIUM BROMIDE AND ALBUTEROL SULFATE 3 ML: .5; 3 SOLUTION RESPIRATORY (INHALATION) at 10:44

## 2018-02-13 RX ADMIN — IPRATROPIUM BROMIDE AND ALBUTEROL SULFATE 3 ML: .5; 3 SOLUTION RESPIRATORY (INHALATION) at 00:09

## 2018-02-13 NOTE — PLAN OF CARE
Problem: Patient Care Overview (Adult)  Goal: Plan of Care Review  Outcome: Ongoing (interventions implemented as appropriate)   02/13/18 1754   Coping/Psychosocial Response Interventions   Plan Of Care Reviewed With patient   Patient Care Overview   Progress improving   Outcome Evaluation   Outcome Summary/Follow up Plan Patient asked about her dressing that has been on since surgery. It was removed and the incision looks approximated.     Goal: Adult Individualization and Mutuality  Outcome: Ongoing (interventions implemented as appropriate)    Goal: Discharge Needs Assessment  Outcome: Ongoing (interventions implemented as appropriate)   02/13/18 1754   Discharge Needs Assessment   Concerns To Be Addressed denies needs/concerns at this time   Readmission Within The Last 30 Days no previous admission in last 30 days   Discharge Disposition home or self-care   Current Health   Anticipated Changes Related to Illness inability to care for self   Self-Care   Equipment Currently Used at Home none   Living Environment   Transportation Available car;family or friend will provide       Problem: Lung Surgery (via Thoracotomy) (Adult)  Goal: Signs and Symptoms of Listed Potential Problems Will be Absent or Manageable (Lung Surgery)  Outcome: Ongoing (interventions implemented as appropriate)   02/13/18 1754   Lung Surgery (via Thoracotomy)   Problems Assessed (Lung Surgery) all   Problems Present (Lung Surgery) pain;pneumothorax  (small pneumothorax per x-ray)

## 2018-02-13 NOTE — PROGRESS NOTES
[facility]    Acute pain service Inpatient Progress Note    Patient Name: Janna Muñiz  :  1955  MRN:  [id number]        Acute Pain  Service Inpatient Progress Note:    Analgesia:Good  Pain Score:2/10  Cardiac: VS stable  Side Effects:None  Catheter Site:clean, dry and dressing intact  Cath type: Epidural cath(MOOG pump)  Catheter Plan:Catheter removed and tip intact

## 2018-02-13 NOTE — PROGRESS NOTES
Cardiothoracic Surgery Progress Note      POD # 6 s/p Right upper lobe wedge resection via thoracotomy       LOS: 6 days      Subjective:  BM yesterday. Patient sitting up in chair.  Reports fatigue and weakness.  Cough improved. Afebrile.    Objective:  Vital Signs  Temp:  [98.1 °F (36.7 °C)-98.2 °F (36.8 °C)] 98.2 °F (36.8 °C)  Heart Rate:  [86-92] 89  Resp:  [16-20] 18  BP: (114-121)/(59-74) 121/62    Physical Exam:   General Appearance: alert, appears stated age and cooperative   Lungs: CTAB   Heart: regular rhythm & normal rate, normal S1, S2 and no murmur, no bora, no rub   Skin: Incision c/d/i     Results:    Results from last 7 days  Lab Units 02/10/18  0329   WBC 10*3/mm3 6.17   HEMOGLOBIN g/dL 10.8*   HEMATOCRIT % 31.0*   PLATELETS 10*3/mm3 162       Results from last 7 days  Lab Units 02/10/18  0329   SODIUM mmol/L 135   POTASSIUM mmol/L 4.3   CHLORIDE mmol/L 101   CO2 mmol/L 29.0   BUN mg/dL 16   CREATININE mg/dL 0.80   GLUCOSE mg/dL 102*   CALCIUM mg/dL 8.2*   5:30 AM chest x-ray portable reveals a small apical right pneumothorax      Assessment:  POD # 6 s/p Right upper lobe wedge resection via thoracotomy, slow progress    Plan:  Wean oxygen as tolerated  Pulmonary toilet-Encourage  Ambulate-Encouarge  Tussionex prn for cough  Repeat chest x-ray in early a.m.  Probable discharge in am    Linnea Dewey PA-C  [facility]  02/13/18   8:07 AM

## 2018-02-13 NOTE — PROGRESS NOTES
[facility]  PROGRESS NOTE    Patient Name: Janna Muñiz  : 1955  MRN: [id number]    Date of Admission: 2018  Length of Stay: 6  Primary Care Physician: Navjot Harris MD    Subjective   Subjective   CC:  Medical managment    HPI:  patient sitting up in chair receiving breathing treatment.  +BM.  No adverse events overnight.  No family in room.  Patient states she is very weak.    Review of Systems  Gen-  Weak, shaky  CV- No chest pain, palpitations  Resp- + cough, dyspnea  GI- No N/V/D, abd pain  Neuro- generalized weakness  Otherwise ROS is negative except as mentioned in the HPI.    Objective   Objective   Vital Signs:   Temp:  [98.1 °F (36.7 °C)-98.2 °F (36.8 °C)] 98.2 °F (36.8 °C)  Heart Rate:  [86-92] 89  Resp:  [16-20] 18  BP: (114-121)/(59-74) 121/62  Physical Exam:  Constitutional:  alert, appears anxious and shaky in NAD  Head: NCAT  ENT:  mucous membranes moist  Respiratory: Clear to auscultation bilaterally, respiratory effort normal,bandage on right side/ back CDI  Cardiovascular: RRR, no murmurs, rubs, or gallops, palpable pedal pulses bilaterally  Gastrointestinal: Positive bowel sounds, soft, nontender, nondistended  Musculoskeletal: No bilateral ankle edema  Psychiatric: Appropriate affect, cooperative  Neurologic: Oriented x 3, strength symmetric in all extremities, Cranial Nerves grossly intact to confrontation, speech clear  Skin: No rashes, Pale    Results Reviewed:  I have personally reviewed current lab, radiology, and data and agree.    Results from last 7 days  Lab Units 02/10/18  0329 18  0704   WBC 10*3/mm3 6.17 8.63   HEMOGLOBIN g/dL 10.8* 12.8   HEMATOCRIT % 31.0* 37.3   PLATELETS 10*3/mm3 162 166       Results from last 7 days  Lab Units 02/10/18  0329 18  0440 18  0704   SODIUM mmol/L 135 135 134   POTASSIUM mmol/L 4.3 5.0 3.9   CHLORIDE mmol/L 101 102 100   CO2 mmol/L 29.0 30.0 26.0   BUN mg/dL 16 19 13    CREATININE mg/dL 0.80 1.00 0.80   GLUCOSE mg/dL 102* 110* 121*   CALCIUM mg/dL 8.2* 9.2 8.7     Estimated Creatinine Clearance: 68.1 mL/min (by C-G formula based on Cr of 0.8).  No results found for: BNP  No results found for: PHART    Microbiology Results Abnormal     Procedure Component Value - Date/Time    Influenza A & B, RT PCR - Swab, Nasopharynx [[id number]]  (Normal) Collected:  02/11/18 1045    Lab Status:  Final result Specimen:  Swab from Nasopharynx Updated:  02/11/18 1157     Influenza A PCR Not Detected     Influenza B PCR Not Detected        Imaging Results (last 24 hours)     Procedure Component Value Units Date/Time    XR Chest 1 View [[id number]] Collected:  02/12/18 0911     Updated:  02/12/18 1037    Narrative:       EXAMINATION: XR CHEST 1 VW- 02/12/2018     INDICATION: post op; Z74.09-Other reduced mobility; R91.1-Solitary  pulmonary nodule; R91.8-Other nonspecific abnormal finding of lung field        COMPARISON: 02/11/2018     FINDINGS: A right chest tube remains in position. There is patchy  airspace disease in the right upper lobe. There is no pneumothorax. The  cardiac silhouette is normal as is the left lung.           Impression:       Stable patchy right upper lobe airspace disease. There is no  pneumothorax.     D:  02/12/2018  E:  02/12/2018     This report was finalized on 2/12/2018 10:34 AM by Dr. Mahesh Narayan MD.       XR Chest 1 View [[id number]] Collected:  02/13/18 0839     Updated:  02/13/18 0841    Narrative:          EXAMINATION: XR CHEST 1 VW-      INDICATION: post op; Z74.09-Other reduced mobility; R91.1-Solitary  pulmonary nodule; R91.8-Other nonspecific abnormal finding of lung field        COMPARISON: Chest x-ray one day prior     FINDINGS: Interval removal of right chest tube. Trace right pneumothorax  at the lung apex. Postsurgical changes noted right upper lobe. No focal  consolidation with left hemithorax remaining clear. No significant  pleural effusion.        Impression:       Interval removal of right chest tube with trace right  pneumothorax present. No acute parenchymal disease otherwise noted.              I have reviewed the medications.    Assessment/Plan   Assessment / Plan     Hospital Problem List     * (Principal)Right upper lobe lung nodule/mass. Noncaseating granuloma on pathology    Overview Signed 2/1/2018 11:50 AM by ANASTASIA Flowers     Added automatically from request for surgery [id number]         S/P video assisted right thoracotomy and wedge resection of right upper lobe nodule/mass. 2/7/18        Brief Hospital Course to date:  Janna Muñiz is a 62 y.o. female admitted 2/7 for RUL lung nodule. She underwent VATS w/wedge resection of nodule on 2/7 with Dr. Lopez. Pathology revealed non-caseating granuloma. Requested hospitalist to follow for med mgmt    Assessment & Plan:  1. RUL nodule s/p VATs:  - path revealed non-caseating granuloma  - Right CTs removed today, will remove epidural in a few hours  - nebs, pulmonary toilet  - tussinex added  --CXR in AM per CTS    2. Fever:  - resolved  - Flu PCR- negative   - on Augmentin per CTS  - sputum cx obtained and pending  - if she becomes febrile again would get blood cultures and check UA    3. Anxiety:  - severe, continue home Wellbutrin and Pristiq    4. Generalized weakness:  - PT/OT to see now that CT dc'd  -- refused rehab to help get stronger; wants to go home    5. Constipation  -- +BM 2/13/18  -- increased bowel regimen    DVT Prophylaxis:  Heparin    CODE STATUS: Full Code    Disposition: I expect the patient to be discharged per CT sudha Mijares, APRN  02/13/18  8:45 AM    /

## 2018-02-13 NOTE — PLAN OF CARE
Problem: Patient Care Overview (Adult)  Goal: Plan of Care Review  Outcome: Ongoing (interventions implemented as appropriate)   02/13/18 0337   Coping/Psychosocial Response Interventions   Plan Of Care Reviewed With patient   Patient Care Overview   Progress improving   Outcome Evaluation   Outcome Summary/Follow up Plan Patient rested comfortably on and off during night. Patient reported pain several times during night. Patient is alert and oriented and is now on 3L O2 nasal canula. Will continue to monitor.      Goal: Adult Individualization and Mutuality  Outcome: Ongoing (interventions implemented as appropriate)    Goal: Discharge Needs Assessment  Outcome: Ongoing (interventions implemented as appropriate)      Problem: Lung Surgery (via Thoracotomy) (Adult)  Goal: Signs and Symptoms of Listed Potential Problems Will be Absent or Manageable (Lung Surgery)  Outcome: Ongoing (interventions implemented as appropriate)

## 2018-02-14 ENCOUNTER — APPOINTMENT (OUTPATIENT)
Dept: GENERAL RADIOLOGY | Facility: HOSPITAL | Age: 63
End: 2018-02-14

## 2018-02-14 VITALS
RESPIRATION RATE: 16 BRPM | DIASTOLIC BLOOD PRESSURE: 56 MMHG | HEIGHT: 63 IN | SYSTOLIC BLOOD PRESSURE: 118 MMHG | WEIGHT: 150 LBS | HEART RATE: 86 BPM | BODY MASS INDEX: 26.58 KG/M2 | OXYGEN SATURATION: 93 % | TEMPERATURE: 98.4 F

## 2018-02-14 LAB
DEPRECATED RDW RBC AUTO: 43.9 FL (ref 37–54)
ERYTHROCYTE [DISTWIDTH] IN BLOOD BY AUTOMATED COUNT: 13.1 % (ref 11.3–14.5)
HCT VFR BLD AUTO: 32.2 % (ref 34.5–44)
HGB BLD-MCNC: 10.7 G/DL (ref 11.5–15.5)
MCH RBC QN AUTO: 31 PG (ref 27–31)
MCHC RBC AUTO-ENTMCNC: 33.2 G/DL (ref 32–36)
MCV RBC AUTO: 93.3 FL (ref 80–99)
PLATELET # BLD AUTO: 213 10*3/MM3 (ref 150–450)
PMV BLD AUTO: 9.3 FL (ref 6–12)
RBC # BLD AUTO: 3.45 10*6/MM3 (ref 3.89–5.14)
WBC NRBC COR # BLD: 3.63 10*3/MM3 (ref 3.5–10.8)

## 2018-02-14 PROCEDURE — 71045 X-RAY EXAM CHEST 1 VIEW: CPT

## 2018-02-14 PROCEDURE — 97110 THERAPEUTIC EXERCISES: CPT

## 2018-02-14 PROCEDURE — 99024 POSTOP FOLLOW-UP VISIT: CPT | Performed by: PHYSICIAN ASSISTANT

## 2018-02-14 PROCEDURE — 94640 AIRWAY INHALATION TREATMENT: CPT

## 2018-02-14 PROCEDURE — 99024 POSTOP FOLLOW-UP VISIT: CPT | Performed by: THORACIC SURGERY (CARDIOTHORACIC VASCULAR SURGERY)

## 2018-02-14 PROCEDURE — 97116 GAIT TRAINING THERAPY: CPT

## 2018-02-14 PROCEDURE — 85027 COMPLETE CBC AUTOMATED: CPT | Performed by: INTERNAL MEDICINE

## 2018-02-14 PROCEDURE — 94799 UNLISTED PULMONARY SVC/PX: CPT

## 2018-02-14 PROCEDURE — 25010000002 PROMETHAZINE PER 50 MG: Performed by: NURSE ANESTHETIST, CERTIFIED REGISTERED

## 2018-02-14 PROCEDURE — 25010000002 HEPARIN (PORCINE) PER 1000 UNITS: Performed by: THORACIC SURGERY (CARDIOTHORACIC VASCULAR SURGERY)

## 2018-02-14 PROCEDURE — 99232 SBSQ HOSP IP/OBS MODERATE 35: CPT | Performed by: NURSE PRACTITIONER

## 2018-02-14 PROCEDURE — 25010000002 ONDANSETRON PER 1 MG: Performed by: PHYSICIAN ASSISTANT

## 2018-02-14 RX ORDER — AMOXICILLIN AND CLAVULANATE POTASSIUM 875; 125 MG/1; MG/1
1 TABLET, FILM COATED ORAL EVERY 12 HOURS
Qty: 4 TABLET | Refills: 0 | Status: SHIPPED | OUTPATIENT
Start: 2018-02-14 | End: 2018-02-16

## 2018-02-14 RX ORDER — IPRATROPIUM BROMIDE AND ALBUTEROL SULFATE 2.5; .5 MG/3ML; MG/3ML
3 SOLUTION RESPIRATORY (INHALATION) EVERY 4 HOURS PRN
Status: DISCONTINUED | OUTPATIENT
Start: 2018-02-14 | End: 2018-02-14 | Stop reason: HOSPADM

## 2018-02-14 RX ORDER — ALBUTEROL SULFATE 90 UG/1
2 AEROSOL, METERED RESPIRATORY (INHALATION) EVERY 4 HOURS PRN
Qty: 1 INHALER | Refills: 0 | Status: SHIPPED | OUTPATIENT
Start: 2018-02-14

## 2018-02-14 RX ADMIN — HYDROCODONE BITARTRATE AND ACETAMINOPHEN 1 TABLET: 7.5; 325 TABLET ORAL at 05:39

## 2018-02-14 RX ADMIN — IPRATROPIUM BROMIDE AND ALBUTEROL SULFATE 3 ML: .5; 3 SOLUTION RESPIRATORY (INHALATION) at 00:49

## 2018-02-14 RX ADMIN — HYDROCODONE BITARTRATE AND ACETAMINOPHEN 1 TABLET: 7.5; 325 TABLET ORAL at 12:11

## 2018-02-14 RX ADMIN — DESVENLAFAXINE SUCCINATE 50 MG: 50 TABLET, EXTENDED RELEASE ORAL at 09:18

## 2018-02-14 RX ADMIN — IPRATROPIUM BROMIDE AND ALBUTEROL SULFATE 3 ML: .5; 3 SOLUTION RESPIRATORY (INHALATION) at 03:56

## 2018-02-14 RX ADMIN — POLYETHYLENE GLYCOL 3350 17 G: 17 POWDER, FOR SOLUTION ORAL at 09:21

## 2018-02-14 RX ADMIN — PROMETHAZINE HYDROCHLORIDE 6.25 MG: 25 INJECTION INTRAMUSCULAR; INTRAVENOUS at 12:16

## 2018-02-14 RX ADMIN — Medication 2 TABLET: at 09:18

## 2018-02-14 RX ADMIN — IPRATROPIUM BROMIDE AND ALBUTEROL SULFATE 3 ML: .5; 3 SOLUTION RESPIRATORY (INHALATION) at 07:02

## 2018-02-14 RX ADMIN — ONDANSETRON HYDROCHLORIDE 4 MG: 2 INJECTION, SOLUTION INTRAMUSCULAR; INTRAVENOUS at 05:39

## 2018-02-14 RX ADMIN — HEPARIN SODIUM 5000 UNITS: 5000 INJECTION, SOLUTION INTRAVENOUS; SUBCUTANEOUS at 09:18

## 2018-02-14 RX ADMIN — AMOXICILLIN AND CLAVULANATE POTASSIUM 1 TABLET: 875; 125 TABLET, FILM COATED ORAL at 05:39

## 2018-02-14 RX ADMIN — GUAIFENESIN 1200 MG: 600 TABLET, EXTENDED RELEASE ORAL at 09:18

## 2018-02-14 RX ADMIN — BUPROPION HYDROCHLORIDE 300 MG: 150 TABLET, FILM COATED, EXTENDED RELEASE ORAL at 09:18

## 2018-02-14 NOTE — PROGRESS NOTES
CTS Progress Note      POD 7 s/p R thoracotomy, RUL wedge     LOS: 7 days     Subjective  Pain catheter removed yesterday. Pain well managed. Cough improved. Doing well this am.     Objective    Vital Signs  Temp:  [98.1 °F (36.7 °C)-98.4 °F (36.9 °C)] 98.4 °F (36.9 °C)  Heart Rate:  [82-90] 86  Resp:  [16-18] 16  BP: (106-121)/(54-65) 118/56    Physical Exam:   General Appearance: alert, appears stated age and cooperative   Lungs: clear to auscultation     Heart: regular rhythm & normal rate, normal S1, S2 and no murmur, no bora, no rub   Skin: Incision c/d/i     Results     Results from last 7 days  Lab Units 02/14/18  0428   WBC 10*3/mm3 3.63   HEMOGLOBIN g/dL 10.7*   HEMATOCRIT % 32.2*   PLATELETS 10*3/mm3 213       Results from last 7 days  Lab Units 02/10/18  0329   SODIUM mmol/L 135   POTASSIUM mmol/L 4.3   CHLORIDE mmol/L 101   CO2 mmol/L 29.0   BUN mg/dL 16   CREATININE mg/dL 0.80   GLUCOSE mg/dL 102*   CALCIUM mg/dL 8.2*       Imaging Results (last 24 hours)     Procedure Component Value Units Date/Time    XR Chest 1 View [[id number]] Collected:  02/13/18 0839     Updated:  02/13/18 0949    Narrative:       EXAMINATION: XR CHEST 1 VW- 02/13/2018     INDICATION: post op; Z74.09-Other reduced mobility; R91.1-Solitary  pulmonary nodule; R91.8-Other nonspecific abnormal finding of lung field        COMPARISON: Chest x-ray one day prior     FINDINGS: Interval removal of right chest tube. Trace right pneumothorax  at the lung apex. Postsurgical changes noted right upper lobe. No focal  consolidation with left hemithorax remaining clear. No significant  pleural effusion.       Impression:       Interval removal of right chest tube with trace right  pneumothorax present. No acute parenchymal disease otherwise noted.     D:  02/13/2018  E:  02/13/2018     This report was finalized on 2/13/2018 9:47 AM by Dr. Tarik Tavares.       XR Chest 1 View [[id number]] Updated:  02/14/18 0620          Assessment    Principal  Problem:    Right upper lobe lung nodule/mass. Noncaseating granuloma on pathology  Active Problems:    S/P video assisted right thoracotomy and wedge resection of right upper lobe nodule/mass. 2/7/18      Plan   Ambulate  Pulm toilet  Discharge home     ANASTASIA Andersen  02/14/18  7:59 AM    As above.  Agree with D/C.I have reviewed, verified, and confirmed the above history and current status.  I have examined the patient and confirmed the above physical findings.    Mir Lopez MD  CTSurgery  02/14/18   8:22 AM

## 2018-02-14 NOTE — PLAN OF CARE
Problem: Patient Care Overview (Adult)  Goal: Plan of Care Review  Outcome: Ongoing (interventions implemented as appropriate)   02/14/18 0334   Coping/Psychosocial Response Interventions   Plan Of Care Reviewed With patient   Patient Care Overview   Progress improving   Outcome Evaluation   Outcome Summary/Follow up Plan Patient rested comfortably throughout night. Patient is alert and oriented and is on room air. Patient did report pain and pain medication was given. Patient stated that she felt better tonight than she did last night. Patient's anxiety and cough seem to be better tonight. Vital signs remain stable. Will continue to monitor.      Goal: Adult Individualization and Mutuality  Outcome: Ongoing (interventions implemented as appropriate)    Goal: Discharge Needs Assessment  Outcome: Ongoing (interventions implemented as appropriate)      Problem: Lung Surgery (via Thoracotomy) (Adult)  Goal: Signs and Symptoms of Listed Potential Problems Will be Absent or Manageable (Lung Surgery)  Outcome: Ongoing (interventions implemented as appropriate)

## 2018-02-14 NOTE — PROGRESS NOTES
Continued Stay Note  [facility]     Patient Name: Janna Muñiz  MRN: [id number]  Today's Date: 2/14/2018    Admit Date: 2/7/2018          Discharge Plan       02/14/18 1257    Case Management/Social Work Plan    Plan Home with daughter    Patient/Family In Agreement With Plan yes    Additional Comments Met with patient at bedside prior to discharge. She is currently on room air. She denies need for home health PT or any DME. Plans on going home with her daughter's support. Linda Delacruz RN [phone]              Discharge Codes       02/14/18 1257    Discharge Codes    Discharge Codes 01  Discharge to home        Expected Discharge Date and Time     Expected Discharge Date Expected Discharge Time    Feb 14, 2018             Linda Delacruz RN

## 2018-02-14 NOTE — PROGRESS NOTES
"Adult Nutrition  Assessment/PES    Patient Name:  Janna Muñiz  YOB: 1955  MRN: [id number]  Admit Date:  2/7/2018    Assessment Date:  2/14/2018        Reason for Assessment       02/14/18 1044    Reason for Assessment    Reason For Assessment/Visit length of stay    Time Spent (min) 20    Diagnosis Diagnosis    Psychosocial Depression   History of    Pulmonary/Critical Care Other (comment)   lung nodule s/p R VATS, wedge biospy RUL (2/7)              Nutrition/Diet History       02/14/18 1045    Nutrition/Diet History    Reported/Observed By Patient    Other Patient reports her appetite has not been great. Today was the first day she has eaten anything more than bites. [facility] [employer] associates are coming in and taking options for meals. Not interested in nutrition supplements at this time.            Anthropometrics       02/14/18 1046    Anthropometrics (Special Considerations)    Height Used for Calculations 1.6 m (5' 3\")    Weight Used for Calculations 67.4 kg (148 lb 9.4 oz)   standing scale weight per charting 2/12            Labs/Tests/Procedures/Meds       02/14/18 1047    Labs/Tests/Procedures/Meds    Labs/Tests Review Reviewed                Nutrition Prescription Ordered       02/14/18 1047    Nutrition Prescription PO    Current PO Diet Regular            Evaluation of Received Nutrient/Fluid Intake       02/14/18 1047    PO Evaluation    Number of Days PO Intake Evaluated --   PO intake recorded from (2/12 - 2/13)    Number of Meals 6    % PO Intake 46%          Problem/Interventions:        Problem 1       02/14/18 1048    Nutrition Diagnoses Problem 1    Problem 1 Inadequate Intake/Infusion    Inadequate Intake Type Oral    Etiology (related to) --   clinical condition/poor appetite    Signs/Symptoms (evidenced by) PO Intake    Percent (%) intake recorded 46 %    Over number of meals 6                Intervention Goal       02/14/18 1055    Intervention Goal    General Nutrition " support treatment    PO Increase intake            Nutrition Intervention       02/14/18 1102    Nutrition Intervention    RD/Tech Action Advise alternate selection;Follow Tx progress;Supplement offered/refused;Encourage intake              Education/Evaluation       02/14/18 1102    Monitor/Evaluation    Monitor Per protocol;PO intake        Electronically signed by:  Skylar Capps  02/14/18 11:02 AM

## 2018-02-14 NOTE — PLAN OF CARE
Problem: Patient Care Overview (Adult)  Goal: Plan of Care Review  Outcome: Unable to achieve outcome(s) by discharge Date Met: 02/14/18 02/14/18 1222   Coping/Psychosocial Response Interventions   Plan Of Care Reviewed With patient;daughter;family   Patient Care Overview   Progress progress towards functional goals is fair   Outcome Evaluation   Outcome Summary/Follow up Plan Able to amb 160 ft w/ Min HHA of 1, w/ 2 sit.rests d/t Mod.SOB., but limited by incr. incis. pain, elev HR & fatigue; partially met transf goal& is indep bed mobil, but did not meet gt. goal , & deferred stair goal (dtr's home has none); should do well w/ dtr's supv for mobil.       Problem: Inpatient Physical Therapy  Goal: Bed Mobility Goal LTG- PT  Outcome: Outcome(s) achieved Date Met: 02/14/18   02/10/18 1330 02/14/18 1222   Bed Mobility PT LTG   Bed Mobility PT LTG, Date Established 02/10/18 --    Bed Mobility PT LTG, Time to Achieve 2 wks --    Bed Mobility PT LTG, Activity Type supine to sit/sit to supine --    Bed Mobility PT LTG, [place] Level independent --    Bed Mobility PT LTG, Date Goal Reviewed --  02/14/18   Bed Mobility PT LTG, Outcome --  goal met     Goal: Transfer Training Goal 1 LTG- PT  Outcome: Unable to achieve outcome(s) by discharge Date Met: 02/14/18   02/10/18 1330 02/14/18 1222   Transfer Training PT LTG   Transfer Training PT LTG, Date Established 02/10/18 --    Transfer Training PT LTG, Time to Achieve 2 wks --    Transfer Training PT LTG, Activity Type sit to stand/stand to sit --    Transfer Training PT LTG, [place] Level conditional independence --    Transfer Training PT LTG, Assist Device walker, rolling --    Transfer Training PT LTG, Date Goal Reviewed --  02/14/18   Transfer Training PT LTG, Outcome --  goal partially met   Transfer Training PT LTG, Reason Goal Not Met --  discharged from facility     Goal: Gait Training Goal LTG- PT  Outcome: Unable to achieve outcome(s) by discharge Date  Met: 02/14/18   02/10/18 1330 02/14/18 1222   Gait Training PT LTG   Gait Training Goal PT LTG, Date Established 02/10/18 --    Gait Training Goal PT LTG, Time to Achieve 2 wks --    Gait Training Goal PT LTG, [place] Level conditional independence --    Gait Training Goal PT LTG, Assist Device walker, rolling --    Gait Training Goal PT LTG, Distance to Achieve 400 feet --    Gait Training Goal PT LTG, Date Goal Reviewed --  02/14/18   Gait Training Goal PT LTG, Outcome --  goal not met   Gait Training Goal PT LTG, Reason Goal Not Met --  discharged from facility     Goal: Stair Training Goal LTG- PT  Outcome: Unable to achieve outcome(s) by discharge Date Met: 02/14/18   02/10/18 1330 02/14/18 1222   Stair Training PT LTG   Stair Training Goal PT LTG, Date Established 02/10/18 --    Stair Training Goal PT LTG, Time to Achieve 2 wks --    Stair Training Goal PT LTG, Number of Steps 15 --    Stair Training Goal PT LTG, [place] Level contact guard assist --    Stair Training Goal PT LTG, Date Goal Reviewed --  02/14/18   Stair Training Goal PT LTG, Outcome --  goal not met   Stair Training Goal PT LTG, Reason Goal Not Met --  other (see comments)  (will d/c to dtr's(no steps))

## 2018-02-14 NOTE — THERAPY DISCHARGE NOTE
Acute Care - Physical Therapy Treatment Note/Discharge  [facility]     Patient Name: Janna Muñiz  : 1955  MRN: [id number]  Today's Date: 2018  Onset of Illness/Injury or Date of Surgery Date: 18  Date of Referral to PT: 02/10/18  Referring Physician: MD Lu    Admit Date: 2018    Visit Dx:    ICD-10-CM ICD-9-CM   1. Impaired functional mobility, balance, gait, and endurance Z74.09 V49.89   2. Lung nodule R91.1 793.11   3. Mass of upper lobe of right lung R91.8 786.6     Patient Active Problem List   Diagnosis   • Fibromyalgia   • Right upper lobe lung nodule/mass. Noncaseating granuloma on pathology   • Right upper lobe Lung nodule. Noncaseating granuloma on pathology   • S/P video assisted right thoracotomy and wedge resection of right upper lobe nodule/mass. 18       Physical Therapy Education     Title: PT OT SLP Therapies (Done)     Topic: Physical Therapy (Done)     Point: Mobility training (Done)    Learning Progress Summary    Learner Readiness Method Response Comment Documented by Status   Patient Eager [place] GRAHAM,H ALBERTA VALENCIA  DM 18 1221 Done    Acceptance E NR  AS 18 1118 Active    Acceptance E VU Elevate B LE and ankle pumps to address B LE edema  18 1127 Done    Acceptance E NR  KR 02/10/18 1329 Active   Family Eager GE[place] GRAHAM,H ALBERTA VALENCIA 18 1221 Done               Point: Home exercise program (Done)    Learning Progress Summary    Learner Readiness Method Response Comment Documented by Status   Patient Eager GE[place] GRAHAM,H ALBERTA VALENCIA 18 1221 Done    Acceptance E NR  AS 18 1118 Active    Acceptance E VU Elevate B LE and ankle pumps to address B LE edema  18 1127 Done   Family Eager GE[place] GRAHAM,H ALBERTA VALENCIA 18 1221 Done               Point: Body mechanics (Done)    Learning Progress Summary    Learner Readiness Method Response Comment Documented by Status   Patient Eager GEORGIA GRAHAM,ALBERTA CLINE 18 1221 Done    Acceptance E NR  AS 18 1118 Active     Acceptance E VU Elevate B LE and ankle pumps to address B LE edema SH 02/11/18 1127 Done    Acceptance E NR  KR 02/10/18 1329 Active   Family Eager E,[place],H ALBERTA VALENCIA  DM 02/14/18 1221 Done               Point: Precautions (Done)    Learning Progress Summary    Learner Readiness Method Response Comment Documented by Status   Patient Eager GE[place] GRAHAM,H ALBERTA VALENCIA 02/14/18 1221 Done    Acceptance E NR  AS 02/12/18 1118 Active    Acceptance E VU Elevate B LE and ankle pumps to address B LE edema SH 02/11/18 1127 Done    Acceptance E NR  KR 02/10/18 1329 Active   Family Eager ED,H ALBERTA VALENCIA  DM 02/14/18 1221 Done                      User Key     Initials Effective Dates Name Provider Type Discipline     06/19/15 -  Elda Short, PT Physical Therapist PT    DM 06/19/15 -  Lay Powell, PT Physical Therapist PT    AS 06/22/15 -  Nikki Bello, PTA Physical Therapy Assistant PT    KR 09/25/17 -  Lavinia Patino, PT Physical Therapist PT                    IP PT Goals       02/14/18 1222 02/12/18 1119 02/10/18 1330    Bed Mobility PT LTG    Bed Mobility PT LTG, Date Established   02/10/18  -KR    Bed Mobility PT LTG, Time to Achieve   2 wks  -KR    Bed Mobility PT LTG, Activity Type   supine to sit/sit to supine  -KR    Bed Mobility PT LTG, [place] Level   independent  -KR    Bed Mobility PT LTG, Date Goal Reviewed 02/14/18  -DM 02/12/18  -AS 02/10/18  -KR    Bed Mobility PT LTG, Outcome goal met  -DM goal ongoing  -AS goal ongoing  -KR    Transfer Training PT LTG    Transfer Training PT LTG, Date Established   02/10/18  -KR    Transfer Training PT LTG, Time to Achieve   2 wks  -KR    Transfer Training PT LTG, Activity Type   sit to stand/stand to sit  -KR    Transfer Training PT LTG, [place] Level   conditional independence  -KR    Transfer Training PT LTG, Assist Device   walker, rolling  -KR    Transfer Training PT  LTG, Date Goal Reviewed 02/14/18  -DM 02/12/18  -AS 02/10/18  -KR    Transfer Training PT LTG,  Outcome goal partially met  -DM goal ongoing  -AS goal ongoing  -KR    Transfer Training PT LTG, Reason Goal Not Met discharged from facility  -DM      Gait Training PT LTG    Gait Training Goal PT LTG, Date Established   02/10/18  -KR    Gait Training Goal PT LTG, Time to Achieve   2 wks  -KR    Gait Training Goal PT LTG, [place] Level   conditional independence  -KR    Gait Training Goal PT LTG, Assist Device   walker, rolling  -KR    Gait Training Goal PT LTG, Distance to Achieve   400 feet  -KR    Gait Training Goal PT LTG, Date Goal Reviewed 02/14/18  -DM 02/12/18  -AS 02/10/18  -KR    Gait Training Goal PT LTG, Outcome goal not met  -DM goal ongoing  -AS goal ongoing  -KR    Gait Training Goal PT LTG, Reason Goal Not Met discharged from facility  -DM      Stair Training PT LTG    Stair Training Goal PT LTG, Date Established   02/10/18  -KR    Stair Training Goal PT LTG, Time to Achieve   2 wks  -KR    Stair Training Goal PT LTG, Number of Steps   15  -KR    Stair Training Goal PT LTG, [place] Level   contact guard assist  -KR    Stair Training Goal PT LTG, Date Goal Reviewed 02/14/18  -DM 02/12/18  -AS 02/10/18  -KR    Stair Training Goal PT LTG, Outcome goal not met  -DM goal ongoing  -AS goal ongoing  -KR    Stair Training Goal PT LTG, Reason Goal Not Met other (see comments)   will d/c to dtr's(no steps)  -DM        User Key  (r) = Recorded By, (t) = Taken By, (c) = Cosigned By    Initials Name Provider Type    DM Lay Powell, PT Physical Therapist    AS Nikki Bello, PTA Physical Therapy Assistant    KAYKAY Patino, PT Physical Therapist              Adult Rehabilitation Note       02/14/18 1139 02/12/18 1024       Rehab Assessment/Intervention    Discipline physical therapist  -DM physical therapy assistant  -AS     Document Type therapy note (daily note);discharge summary  -DM therapy note (daily note)  -AS     Subjective Information agree to therapy;complains of;weakness;pain  -DM  agree to therapy;complains of;weakness  -AS     Patient Effort, Rehab Treatment good  -DM good  -AS     Symptoms Noted During/After Treatment fatigue;increased pain;significant change in vital signs  -DM fatigue  -AS     Precautions/Limitations fall precautions   R thoracot. incis.;lung pillow  -DM fall precautions;oxygen therapy device and L/min;other (see comments)   thoracic epidural catheter  -AS     Recorded by [DM] Lay Powell, PT [AS] Nikki Bello, MARSHA     Vital Signs    Pre Systolic BP Rehab 116  -DM      Pre Treatment Diastolic BP 65  -DM      Post Systolic BP Rehab 131  -DM      Post Treatment Diastolic BP 58  -DM      Pretreatment Heart Rate (beats/min) 94  -DM      Intratreatment Heart Rate (beats/min) 115  -DM      Posttreatment Heart Rate (beats/min) 93  -DM      Pre SpO2 (%) 94  -DM      O2 Delivery Pre Treatment room air  -DM      Pre Patient Position Sitting  -DM      Intra Patient Position Standing  -DM      Post Patient Position Sitting  -DM      Recorded by [DM] Lay Powell, PT      Pain Assessment    Pain Assessment 0-10  -DM 0-10  -AS     Pain Score 8  -DM 3  -AS     Post Pain Score 9  -DM 3  -AS     Pain Type Acute pain  -DM Acute pain  -AS     Pain Location Rib cage  -DM Rib cage  -AS     Pain Orientation Right  -DM Right;Left  -AS     Pain Descriptors Aching  -DM      Pain Frequency Constant/continuous  -DM      Patient's Stated Pain Goal No pain  -DM      Pain Intervention(s) Repositioned;Medication (See MAR);Rest   nsg brought pain pill  -DM Repositioned;Ambulation/increased activity  -AS     Response to Interventions tolerated  -DM tolerated  -AS     Recorded by [DM] Lay Powell, PT [AS] Nikki Bello, MARSHA     Cognitive Assessment/Intervention    Current Cognitive/Communication Assessment functional  -DM functional  -AS     Orientation Status oriented x 4  -DM oriented x 4  -AS     Follows Commands/Answers Questions 100% of the time;able to follow single-step  instructions;needs cueing;needs increased time;needs repetition  -% of the time;able to follow single-step instructions  -AS     Personal Safety WNL/WFL  -DM mild impairment  -AS     Personal Safety Interventions fall prevention program maintained;gait belt;nonskid shoes/slippers when out of bed  -DM fall prevention program maintained;gait belt;nonskid shoes/slippers when out of bed;other (see comments)   exit alarm  -AS     Recorded by [DM] Lay Powell, PT [AS] Nikki Bello PTA     Bed Mobility, Assessment/Treatment    Bed Mobility, Comment UIC  -DM UIC  -AS     Recorded by [DM] Lay Powell, PT [AS] Nikki Bello PTA     Transfer Assessment/Treatment    Transfers, Sit-Stand Sherman independent   MIP x 5  -DM verbal cues required;contact guard assist;1 person + 1 person to manage equipment  -AS     Transfers, Stand-Sit [place] contact guard assist   FROM VANEGAS chair w/o armrests  -DM verbal cues required;contact guard assist;1 person + 1 person to manage equipment  -AS     Transfers, Sit-Stand-Sit, Assist Device --   gt belt; no longer req.Rwx  -DM rolling walker  -AS     Toilet Transfer, Sherman independent  -DM      Transfer, Safety Issues weight-shifting ability decreased  -DM      Transfer, Impairments impaired balance;strength decreased;pain  -DM strength decreased  -AS     Transfer, Comment cues for HP,seq  -DM verbal cues for hand placement, patient lethargic and needed cues to keep eyes open throughout session  -AS     Recorded by [DM] Lay Powell, PT [AS] Nikki Bello, MARSHA     Gait Assessment/Treatment    Gait, Sherman Level minimum assist (75% patient effort);verbal cues required   min HHA  -DM verbal cues required;minimum assist (75% patient effort);1 person + 1 person to manage equipment  -AS     Gait, Assistive Device --   GT BELT  -DM rolling walker  -AS     Gait, Distance (Feet) 160   20 TO BR;140 in vanegas;5 min sit rest(modSOB/fat.);LE exer  -DM  35  -AS     Gait, Gait Pattern Analysis swing-through gait  -DM      Gait, Gait Deviations westley decreased;decreased heel strike;forward flexed posture;narrow base;step length decreased;weight-shifting ability decreased   HR ;5 min. rest s/p toileting 5 min, & again in vanegas  -DM westley decreased;step length decreased;forward flexed posture  -AS     Gait, Safety Issues step length decreased;weight-shifting ability decreased   pt splinting against R ribcage for comfort;2 cough.spells  -DM step length decreased;supplemental O2  -AS     Gait, Impairments strength decreased;impaired balance;pain  -DM strength decreased;pain;impaired balance  -AS     Gait, Comment cues to incr step length & MATHEW, ext trunk/focus ahead, PLB exer  -DM patient needs verbal cues to stay inside walker, very slow pace with decreased step length.  -AS     Recorded by [DM] Lay Powell, PT [AS] Nikki Bello, MARSHA     Motor Skills/Interventions    Additional Documentation Balance Skills Training (Group)  -DM      Recorded by [DM] Lay Powell, PT      Balance Skills Training    Sitting-Level of Assistance Close supervision  -DM      Sitting-Balance Support Feet supported  -DM      Sitting-Balance Activities Trunk control activities  -DM      Sitting # of Minutes 10   5 x 2  -DM      Standing-Level of Assistance Contact guard  -DM      Static Standing Balance Support No upper extremity supported  -DM      Standing-Balance Activities Weight Shift A-P;Weight Shift R-L   MIP;hygiene  -DM      Standing Balance # of Minutes 1  -DM      Gait Balance-Level of Assistance Minimum assistance  -DM      Gait Balance Support Right upper extremity supported  -DM      Gait Balance Activities scanning environment R/L;side-stepping  -DM      Recorded by [DM] Lay Powell, PT      Therapy Exercises    Bilateral Lower Extremities AROM:;10 reps;sitting;standing;ankle pumps/circles;hip abduction/adduction;heel slides;hip flexion;LAQ  -DM AROM:;10  reps;sitting;ankle pumps/circles;hip flexion;20 reps;LAQ  -AS     Bilateral Upper Extremity AROM:;10 reps;sitting;elbow flexion/extension;shoulder abduction/adduction;shoulder extension/flexion  -DM      Recorded by [DM] Lay Powell, PT [AS] Nikki Bello PTA     Positioning and Restraints    Pre-Treatment Position sitting in chair/recliner  -DM sitting in chair/recliner  -AS     Post Treatment Position chair  -DM chair  -AS     In Chair notified nsg;reclined;call light within reach;encouraged to call for assist;with family/caregiver;legs elevated   ret. w/HEP;instructed  -DM reclined;call light within reach;encouraged to call for assist;exit alarm on;with family/caregiver  -AS     Recorded by [DM] Lay Powell, PT [AS] Nikki Bello PTA       User Key  (r) = Recorded By, (t) = Taken By, (c) = Cosigned By    Initials Name Effective Dates    DM Lay Powell, PT 06/19/15 -     AS Nikki Bello PTA 06/22/15 -           PT Recommendation and Plan  Anticipated Discharge Disposition: home with assist  PT Frequency: daily  Plan of Care Review  Plan Of Care Reviewed With: patient, daughter, family  Progress: progress towards functional goals is fair  Outcome Summary/Follow up Plan: Able to amb 160 ft w/ Min HHA of 1, w/ 2 sit.rests d/t Mod.SOB., but limited by incr. incis. pain, elev HR & fatigue; partially met transf goal& is indep bed mobil, but did not meet gt. goal , & deferred stair goal (dtr's home has none); should do well w/ dtr's supv for mobil.          Outcome Measures       02/14/18 1139 02/12/18 1024       How much help from another person do you currently need...    Turning from your back to your side while in flat bed without using bedrails? 4  -DM 3  -AS     Moving from lying on back to sitting on the side of a flat bed without bedrails? 4  -DM 3  -AS     Moving to and from a bed to a chair (including a wheelchair)? 3  -DM 3  -AS     Standing up from a chair using your arms (e.g.,  wheelchair, bedside chair)? 4  -DM 3  -AS     Climbing 3-5 steps with a railing? 2  -DM 2  -AS     To walk in hospital room? 3  -DM 3  -AS     AM-PAC 6 Clicks Score 20  -DM 17  -AS     Functional Assessment    Outcome Measure Options AM-PAC 6 Clicks Basic Mobility (PT)  -DM AM-PAC 6 Clicks Basic Mobility (PT)  -AS       User Key  (r) = Recorded By, (t) = Taken By, (c) = Cosigned By    Initials Name Provider Type    HU Powell, PT Physical Therapist    AS Nikki Bello, PTA Physical Therapy Assistant           Time Calculation:         PT Charges       02/14/18 1227          Time Calculation    Start Time 1139  -DM      PT Received On 02/14/18  -DM      PT Goal Re-Cert Due Date 02/20/18  -DM      Time Calculation- PT    Total Timed Code Minutes- PT 25 minute(s)  -DM        User Key  (r) = Recorded By, (t) = Taken By, (c) = Cosigned By    Initials Name Provider Type    HU Powell, PT Physical Therapist          Therapy Charges for Today     Code Description Service Date Service Provider Modifiers Qty    07783300157 HC PT THER PROC EA 15 MIN 2/14/2018 Lay Powell, PT GP 1    [id number] HC GAIT TRAINING EA 15 MIN 2/14/2018 Lay Powell, PT GP 1          PT G-Codes  Outcome Measure Options: AM-PAC 6 Clicks Basic Mobility (PT)    PT Discharge Summary  Anticipated Discharge Disposition: home with assist  Reason for Discharge: Discharge from facility  Outcomes Achieved: Patient able to partially acheive established goals  Discharge Destination: Home with assist    Lay Powell, PT  2/14/2018

## 2018-02-14 NOTE — PROGRESS NOTES
[facility] Medicine Services  PROGRESS NOTE    Patient Name: Janna Muñiz  : 1955  MRN: [id number]    Date of Admission: 2018  Length of Stay: 7  Primary Care Physician: Navjot Harris MD    Subjective   Subjective   CC:  Medical managment    HPI:  Patient sitting up in chair in NAD.  Patient states she's feeling much better today.  Positive BM.  No adverse fence overnight.  No family in the room.  Patient is refusing rehabilitation and wants to go home.  Patient states that her daughter coming to stay with her.    Review of Systems  Gen-  Weak, shaky  CV- No chest pain, palpitations  Resp- + cough, dyspnea  GI- No N/V/D, abd pain  Neuro- generalized weakness  Otherwise ROS is negative except as mentioned in the HPI.    Objective   Objective   Vital Signs:   Temp:  [98.1 °F (36.7 °C)-98.4 °F (36.9 °C)] 98.4 °F (36.9 °C)  Heart Rate:  [82-90] 86  Resp:  [16-18] 16  BP: (106-121)/(54-65) 118/56  Physical Exam:  Constitutional:  alert, appears anxious and tremulous (patient states she's had this for a long time) in NAD  Head: NCAT  ENT:  mucous membranes moist  Respiratory: Clear to auscultation bilaterally, respiratory effort normal,bandage on right side/ lower back CDI  Cardiovascular: RRR, no murmurs, rubs, or gallops, palpable pedal pulses bilaterally  Gastrointestinal: Positive bowel sounds, soft, nontender, nondistended  Musculoskeletal: No bilateral ankle edema  Psychiatric: Appropriate affect, cooperative  Neurologic: Oriented x 4, strength symmetric in all extremities, speech clear, long-standing tremors, 5/5 strength in UE and LE and equal bilaterally but generally weak  Skin: No rashes, Pale, warm, dry.  Thoracotomy incision and chest tube incisions healing without drainage, erythema or swelling    Results Reviewed:  I have personally reviewed current lab, radiology, and data and agree.    Results from last 7 days  Lab Units 18  0428 02/10/18  0323  02/08/18  0704   WBC 10*3/mm3 3.63 6.17 8.63   HEMOGLOBIN g/dL 10.7* 10.8* 12.8   HEMATOCRIT % 32.2* 31.0* 37.3   PLATELETS 10*3/mm3 213 162 166       Results from last 7 days  Lab Units 02/10/18  0329 02/09/18  0440 02/08/18  0704   SODIUM mmol/L 135 135 134   POTASSIUM mmol/L 4.3 5.0 3.9   CHLORIDE mmol/L 101 102 100   CO2 mmol/L 29.0 30.0 26.0   BUN mg/dL 16 19 13   CREATININE mg/dL 0.80 1.00 0.80   GLUCOSE mg/dL 102* 110* 121*   CALCIUM mg/dL 8.2* 9.2 8.7     Estimated Creatinine Clearance: 68.3 mL/min (by C-G formula based on Cr of 0.8).  No results found for: BNP  No results found for: PHART    Microbiology Results Abnormal     Procedure Component Value - Date/Time    Influenza A & B, RT PCR - Swab, Nasopharynx [[id number]]  (Normal) Collected:  02/11/18 1045    Lab Status:  Final result Specimen:  Swab from Nasopharynx Updated:  02/11/18 1157     Influenza A PCR Not Detected     Influenza B PCR Not Detected        Imaging Results (last 24 hours)     Procedure Component Value Units Date/Time    XR Chest 1 View [[id number]] Collected:  02/13/18 0839     Updated:  02/13/18 0949    Narrative:       EXAMINATION: XR CHEST 1 VW- 02/13/2018     INDICATION: post op; Z74.09-Other reduced mobility; R91.1-Solitary  pulmonary nodule; R91.8-Other nonspecific abnormal finding of lung field        COMPARISON: Chest x-ray one day prior     FINDINGS: Interval removal of right chest tube. Trace right pneumothorax  at the lung apex. Postsurgical changes noted right upper lobe. No focal  consolidation with left hemithorax remaining clear. No significant  pleural effusion.       Impression:       Interval removal of right chest tube with trace right  pneumothorax present. No acute parenchymal disease otherwise noted.     D:  02/13/2018  E:  02/13/2018     This report was finalized on 2/13/2018 9:47 AM by Dr. Tarik Tavares.       XR Chest 1 View [[id number]] Updated:  02/14/18 0620        I have reviewed the  medications.    Assessment/Plan   Assessment / Plan     Hospital Problem List     * (Principal)Right upper lobe lung nodule/mass. Noncaseating granuloma on pathology    Overview Signed 2/1/2018 11:50 AM by ANASTASIA Flowers     Added automatically from request for surgery [id number]         S/P video assisted right thoracotomy and wedge resection of right upper lobe nodule/mass. 2/7/18        Brief Hospital Course to date:  Janna Muñiz is a 62 y.o. female admitted 2/7 for RUL lung nodule. She underwent VATS w/wedge resection of nodule on 2/7 with Dr. Lopez. Pathology revealed non-caseating granuloma. Requested hospitalist to follow for med mgmt    Assessment & Plan:  RUL nodule s/p VATs:  - path revealed non-caseating granuloma  - Right CTs removed today, will remove epidural in a few hours  - nebs, pulmonary toilet  - tussinex added  --CXR in AM per CTS shows right hemidiaphragm and same small anterior apical pneumothorax    Fever:  - resolved  - Flu PCR- negative   --DC home on Augmentin to complete 7 day course  --DC home with albuterol inhaler PRN  - if she becomes febrile again would get blood cultures and check UA    Anxiety:  - severe, continue home Wellbutrin and Pristiq    Generalized weakness:  - PT/OT to see now that CT dc'd  -- refused rehab to help get stronger; wants to go home    Constipation  -- +BM 2/13/18  -- dc w/bowel regimen     Hospital medicine service will sign off at this time; discharge recommendations in place and completed on MAR; please reconsult as needed  if patient does not get discharged.    DVT Prophylaxis:  Heparin    CODE STATUS: Full Code    Disposition: I expect the patient to be discharged per CT sudha Mijares, APRN  02/14/18  8:16 AM    /

## 2018-02-15 NOTE — DISCHARGE SUMMARY
CTS Discharge Summary    Patient Care Team:  Navjot Harris MD as PCP - General (Family Medicine)      Date of Admission: 2/7/2018  5:24 AM  Date of Discharge:  02/14/2018    Discharge Diagnosis  Past Medical History:   Diagnosis Date   • Anxiety    • Arthritis 1980   • Bladder infection     3/year    • Bronchitis    • Depression 2003    severe   • Fibromyalgia 1999   • Lung mass    • Wears glasses        Principal Problem:    Right upper lobe lung nodule/mass. Noncaseating granuloma on pathology  Active Problems:    S/P video assisted right thoracotomy and wedge resection of right upper lobe nodule/mass. 2/7/18      History of Present Illness 62-year-old  female (lifelong nonsmoker) presents with a pulmonary nodule (8-10 millimeters) of the right upper lobe.  After thorough evaluation patient was given the option of wedge resection versus observation.  Discussed procedure in detail and also discussed the option of observation the patient understood the procedure she understood risks and benefits of the procedure and wished to proceed.        Hospital Course  Patient is a 62 y.o. female found to have an 8-10 mm right upper lobe nodule.  Is in for pulmonary resection at this time.  Patient segment operating suite on 02/07/2018 and underwent a bronchoscopy a right VATS with biopsy of the upper lobe.  Then a right thoracotomy and wedge biopsy of the right upper lobe.  Patient tolerated the procedure well.  Pathology showed  Noncaseating granulomas, GMS and BASIL stains negative for organisms area patient was taken to the recovery room then to the intensive care unit.  While intensive care unit patient was seen by the hospital intensivist.  Postoperative day #1 patient awake alert and extubated.  Chest tube was 400 cc out the previous 18 hours.  Day 2 patient had little of diuresis with some Lasix.  Day 3 was noted to have a small air leak from the chest tubes patient was transferred to telemetry.  While up on  telemetry patient ambulating with physical therapy air leak subsided.  Chest tubes were removed.  And patient was ready for discharge home on 02/14/2018.    Procedures Performed  Procedure(s):  bronchoscopy, Video Assisted Thoracoscopy right side and open Thoracotomy with Right upper lobe wedge resection       Consults:   Consults     No orders found from 1/9/2018 to 2/8/2018.            Discharge Medications   Janna Muñiz   Home Medication Instructions LUCERO:[id number]    Printed on:02/15/18 0901   Medication Information                      albuterol (PROVENTIL HFA;VENTOLIN HFA) 108 (90 Base) MCG/ACT inhaler  Inhale 2 puffs Every 4 (Four) Hours As Needed for Wheezing.             amoxicillin-clavulanate (AUGMENTIN) 875-125 MG per tablet  Take 1 tablet by mouth Every 12 (Twelve) Hours for 4 doses. Indications: Upper Respiratory Tract Infection             buPROPion XL (WELLBUTRIN XL) 300 MG 24 hr tablet  Take 1 tablet by mouth Every Morning.             desvenlafaxine (PRISTIQ) 50 MG 24 hr tablet  Take 50 mg by mouth Daily.             HYDROcodone-acetaminophen (NORCO) 7.5-325 MG per tablet  Take 1 tablet by mouth Every 4 (Four) Hours As Needed for Moderate Pain  for up to 9 days.             hydrOXYzine (VISTARIL) 25 MG capsule  Take 25 mg by mouth 2 (Two) Times a Day As Needed for Anxiety.             ibuprofen (ADVIL,MOTRIN) 200 MG tablet  Take 400 mg by mouth Every 6 (Six) Hours As Needed for Mild Pain .                 Discharge Diet:  regular diet    Activity at Discharge:   Do not drive while taking narcotics as tolerated with positive range of motion exercises to the right upper extremity    Follow-up Appointments  Future Appointments  Date Time Provider Department Center   3/1/2018 11:15 AM Mir Lopez MD [facility] None     This discharge summary took less than 30 minutes to compile     ANASTASIA Richardson  02/15/18  9:01 AM

## 2018-03-05 LAB
CYTO UR: NORMAL
LAB AP CASE REPORT: NORMAL
LAB AP CLINICAL INFORMATION: NORMAL
Lab: NORMAL
Lab: NORMAL
PATH REPORT.ADDENDUM SPEC: NORMAL
PATH REPORT.FINAL DX SPEC: NORMAL
PATH REPORT.GROSS SPEC: NORMAL

## 2018-03-08 ENCOUNTER — OFFICE VISIT (OUTPATIENT)
Dept: CARDIAC SURGERY | Facility: CLINIC | Age: 63
End: 2018-03-08

## 2018-03-08 VITALS
SYSTOLIC BLOOD PRESSURE: 114 MMHG | WEIGHT: 134.4 LBS | HEART RATE: 90 BPM | OXYGEN SATURATION: 97 % | HEIGHT: 63 IN | BODY MASS INDEX: 23.81 KG/M2 | TEMPERATURE: 97.6 F | DIASTOLIC BLOOD PRESSURE: 77 MMHG

## 2018-03-08 DIAGNOSIS — R91.1 PULMONARY NODULE, RIGHT: Primary | ICD-10-CM

## 2018-03-08 PROCEDURE — 99024 POSTOP FOLLOW-UP VISIT: CPT | Performed by: THORACIC SURGERY (CARDIOTHORACIC VASCULAR SURGERY)

## 2018-03-08 PROCEDURE — 71046 X-RAY EXAM CHEST 2 VIEWS: CPT | Performed by: THORACIC SURGERY (CARDIOTHORACIC VASCULAR SURGERY)

## 2018-03-08 NOTE — PROGRESS NOTES
03/08/2018  Patient Information  Janna Muñiz                                                                                          [address] [facility]  [address] [place] [id number]   1955  'PCP/Referring Physician'  Navjot Harris MD  [phone]  No ref. provider found    Chief Complaint   Patient presents with   • Post-op Follow-up     s/p thor, r upper lobe wedge rescection 02/07/2018     CC: I'm sore under my right breast    History of Present Illness: 62-year-old  female now 1 month postoperative right VATS with biopsy of the right upper lobe and subsequent right thoracotomy with wedge resection of right upper lobe.  Final pathology report is consistent with noncaseating granulomas. The patient has done well.  She denies fever, chills, and night sweats.  She has not had hemoptysis, cough, wound erythema, or wound drainage.  She still has pain in the area of the incision and extending anteriorly under the right breast.  The pain is associated with paresthesias.      Patient Active Problem List   Diagnosis   • Fibromyalgia   • Right upper lobe lung nodule/mass. Noncaseating granuloma on pathology   • Right upper lobe Lung nodule. Noncaseating granuloma on pathology   • S/P video assisted right thoracotomy and wedge resection of right upper lobe nodule/mass. 2/7/18     Past Medical History:   Diagnosis Date   • Anxiety    • Arthritis 1980   • Bladder infection     3/year    • Bronchitis    • Depression 2003    severe   • Fibromyalgia 1999   • Lung mass    • Wears glasses      Past Surgical History:   Procedure Laterality Date   • CHOLECYSTECTOMY  2001   • COLONOSCOPY  10/2017   • HYSTERECTOMY  2001   • PARTIAL HYSTERECTOMY  [id number]   • THORACOSCOPY Right 2/7/2018    Procedure: bronchoscopy, Video Assisted Thoracoscopy right side and open Thoracotomy with Right upper lobe wedge resection;  Surgeon: Mir Lopez MD;  Location: [facility];  Service:        Current Outpatient Prescriptions:    •  buPROPion XL (WELLBUTRIN XL) 300 MG 24 hr tablet, Take 1 tablet by mouth Every Morning., Disp: , Rfl: 0  •  desvenlafaxine (PRISTIQ) 50 MG 24 hr tablet, Take 50 mg by mouth Daily., Disp: , Rfl: 0  •  hydrOXYzine (VISTARIL) 25 MG capsule, Take 25 mg by mouth 2 (Two) Times a Day As Needed for Anxiety., Disp: , Rfl:   •  ibuprofen (ADVIL,MOTRIN) 200 MG tablet, Take 400 mg by mouth Every 6 (Six) Hours As Needed for Mild Pain ., Disp: , Rfl:   •  albuterol (PROVENTIL HFA;VENTOLIN HFA) 108 (90 Base) MCG/ACT inhaler, Inhale 2 puffs Every 4 (Four) Hours As Needed for Wheezing., Disp: 1 inhaler, Rfl: 0  No current facility-administered medications for this visit.     Facility-Administered Medications Ordered in Other Visits:   •  Chlorhexidine Gluconate Cloth 2 % pads 1 application, 1 application, Topical, Q12H PRN, ANASTASIA Andersen  Allergies   Allergen Reactions   • Iodine Other (See Comments)     blisters   • Darvon [Propoxyphene] GI Intolerance   • Paxil [Paroxetine] Rash   • Percocet [Oxycodone-Acetaminophen] GI Intolerance   • Sulfa Antibiotics Nausea And Vomiting     Social History     Social History   • Marital status: Single     Spouse name: N/A   • Number of children: 3   • Years of education: N/A     Occupational History   •  Disabled     Social History Main Topics   • Smoking status: Never Smoker   • Smokeless tobacco: Never Used   • Alcohol use No   • Drug use: No   • Sexual activity: Defer     Other Topics Concern   • Not on file     Social History Narrative    Lives in [place], [place] with granddaughter     Family History   Problem Relation Age of Onset   • Heart disease Mother    • COPD Father    • Depression Father    • Depression Sister    • Heart disease Brother    • Heart disease Maternal Grandmother    • Cancer Maternal Grandfather    • Mental illness Paternal Grandmother    • No Known Problems Paternal Grandfather      Review of Systems   Constitution: Negative for chills, fever,  "malaise/fatigue, night sweats and weight loss.        I have pain under my right breast   HENT: Negative for hearing loss, odynophagia and sore throat.    Cardiovascular: Negative for chest pain, dyspnea on exertion, leg swelling, orthopnea and palpitations.   Respiratory: Positive for shortness of breath. Negative for cough, hemoptysis, sputum production and wheezing.    Endocrine: Negative for cold intolerance, heat intolerance, polydipsia, polyphagia and polyuria.   Hematologic/Lymphatic: Does not bruise/bleed easily.   Skin: Negative for itching and rash.   Musculoskeletal: Negative for joint pain, joint swelling and myalgias.   Gastrointestinal: Negative for abdominal pain, constipation, diarrhea, hematemesis, hematochezia, melena, nausea and vomiting.   Genitourinary: Negative for dysuria, frequency and hematuria.   Neurological: Negative for focal weakness, headaches, numbness and seizures.   Psychiatric/Behavioral: Negative for suicidal ideas.   All other systems reviewed and are negative.    Vitals:    03/08/18 1151   BP: 114/77   BP Location: Left arm   Patient Position: Sitting   Pulse: 90   Temp: 97.6 °F (36.4 °C)   TempSrc: Temporal Artery    SpO2: 97%   Weight: 61 kg (134 lb 6.4 oz)   Height: 160 cm (63\")      Physical Exam   Constitutional: She is oriented to person, place, and time. She appears well-developed and well-nourished. No distress.   HENT:   Head: Normocephalic.   Right Ear: External ear normal.   Left Ear: External ear normal.   Nose: Nose normal.   Eyes: Pupils are equal, round, and reactive to light.   Neck: Normal range of motion. Neck supple. No tracheal deviation present. No thyromegaly present.   Cardiovascular: Normal rate, normal heart sounds and intact distal pulses.    No murmur heard.  Pulmonary/Chest: Effort normal and breath sounds normal. No respiratory distress. She has no wheezes. She has no rales. She exhibits no tenderness.   Decreased breath sounds at right base "   Abdominal: Soft. Bowel sounds are normal. She exhibits no distension and no mass. There is no tenderness.   Musculoskeletal: Normal range of motion. She exhibits no edema.   Healing right thoracotomy incision without erythema and without drainage.  Chest tube sites clean and dry.  One silk suture removed.   Lymphadenopathy:     She has no cervical adenopathy.   Neurological: She is alert and oriented to person, place, and time. She has normal reflexes. No cranial nerve deficit.   Skin: Skin is warm and dry. No rash noted. No erythema.   Psychiatric: She has a normal mood and affect.       Labs/Imaging: PA and lateral chest x-ray.  Lung fields are clear.  The right hemidiaphragm is very slightly elevated.  There is some residual pleural reaction on the right laterally the x-ray is otherwise normal.    Assessment: Stable postoperative course following open lung biopsy.    Plan: The patient's final pathology was benign.  She will continue follow-up with her family physician.  She will return to this office when necessary       Patient Active Problem List   Diagnosis   • Fibromyalgia   • Right upper lobe lung nodule/mass. Noncaseating granuloma on pathology   • Right upper lobe Lung nodule. Noncaseating granuloma on pathology   • S/P video assisted right thoracotomy and wedge resection of right upper lobe nodule/mass. 2/7/18     Signed by:      Mir Lopez MD  [facility]  03/09/18   1:26 PM

## 2018-08-15 ENCOUNTER — HOSPITAL ENCOUNTER (OUTPATIENT)
Dept: GENERAL RADIOLOGY | Facility: HOSPITAL | Age: 63
Discharge: HOME | End: 2018-08-15
Attending: FAMILY MEDICINE | Admitting: FAMILY MEDICINE

## 2018-08-15 ENCOUNTER — TRANSCRIBE ORDERS (OUTPATIENT)
Dept: ADMINISTRATIVE | Facility: HOSPITAL | Age: 63
End: 2018-08-15

## 2018-08-15 DIAGNOSIS — R91.1 NODULE OF RIGHT LUNG: Primary | ICD-10-CM

## 2018-08-15 PROCEDURE — 71046 X-RAY EXAM CHEST 2 VIEWS: CPT

## 2019-01-22 ENCOUNTER — TRANSCRIBE ORDERS (OUTPATIENT)
Dept: ADMINISTRATIVE | Facility: HOSPITAL | Age: 64
End: 2019-01-22

## 2019-01-22 ENCOUNTER — HOSPITAL ENCOUNTER (OUTPATIENT)
Dept: GENERAL RADIOLOGY | Facility: HOSPITAL | Age: 64
Discharge: HOME | End: 2019-01-22
Attending: FAMILY MEDICINE | Admitting: FAMILY MEDICINE

## 2019-01-22 DIAGNOSIS — M25.559 ARTHRALGIA OF HIP, UNSPECIFIED LATERALITY: ICD-10-CM

## 2019-01-22 DIAGNOSIS — M25.559 ARTHRALGIA OF HIP, UNSPECIFIED LATERALITY: Primary | ICD-10-CM

## 2019-01-22 PROCEDURE — 73502 X-RAY EXAM HIP UNI 2-3 VIEWS: CPT

## (undated) DEVICE — CANNULA,OXY,ADULT,SUPERSOFT,W/7'TUB,UC: Brand: MEDLINE

## (undated) DEVICE — ANTIBACTERIAL UNDYED BRAIDED (POLYGLACTIN 910), SYNTHETIC ABSORBABLE SURGICAL SUTURE: Brand: COATED VICRYL

## (undated) DEVICE — SPNG GZ WOVN 4X4IN 12PLY 10/BX STRL

## (undated) DEVICE — SCOPE WARMER THAT PRE-WARMS MULTIPLE LAPAROSCOPES.: Brand: JOSNOE MEDICAL INC

## (undated) DEVICE — STERILE LATEX POWDER FREE SURGICAL GLOVES WITH HYDROGEL COATING: Brand: PROTEXIS

## (undated) DEVICE — ECHELON FLEX 60 ARTICULATING ENDOSCOPIC LINEAR CUTTER (NO CARTRIDGE): Brand: ECHELON FLEX ENDOPATH

## (undated) DEVICE — DEFOGGER!" ANTI FOG KIT: Brand: DEROYAL

## (undated) DEVICE — TRAP,MUCUS SPECIMEN,40CC: Brand: MEDLINE

## (undated) DEVICE — TISSUE RETRIEVAL SYSTEM: Brand: INZII RETRIEVAL SYSTEM

## (undated) DEVICE — 32 FR STRAIGHT – SOFT PVC CATHETER: Brand: PVC THORACIC CATHETERS

## (undated) DEVICE — ENCORE® LATEX MICRO SIZE 8, STERILE LATEX POWDER-FREE SURGICAL GLOVE: Brand: ENCORE

## (undated) DEVICE — SYR LL TP 10ML STRL

## (undated) DEVICE — AIRWY 90MM NO9

## (undated) DEVICE — SUT MNCRYL 3/0 SH 27 IN Y416H

## (undated) DEVICE — OASIS DRAIN, SINGLE, INLINE & ATS COMPATIBLE: Brand: OASIS

## (undated) DEVICE — SAFESECURE,SECUREMENT,FOLEY CATH,STERILE: Brand: MEDLINE

## (undated) DEVICE — TUBING, SUCTION, 1/4" X 10', STRAIGHT: Brand: MEDLINE

## (undated) DEVICE — MEDI-VAC YANKAUER SUCTION HANDLE W/BULBOUS TIP: Brand: CARDINAL HEALTH

## (undated) DEVICE — 2, DISPOSABLE SUCTION/IRRIGATOR WITHOUT DISPOSABLE TIP: Brand: STRYKEFLOW

## (undated) DEVICE — COVADERM: Brand: DEROYAL

## (undated) DEVICE — SUT VIC 2/0 CT2 27IN J269H

## (undated) DEVICE — TRY EPID CONT FLO

## (undated) DEVICE — DRSNG WND BORDR/ADHS NONADHR/GZ LF 4X4IN STRL

## (undated) DEVICE — NDL HYPO ECLPS SFTY 22G 1 1/2IN

## (undated) DEVICE — CLTH CLENS READYCLEANSE PERI CARE PK/5

## (undated) DEVICE — MAGNETIC DRAPE: Brand: DEVON

## (undated) DEVICE — PK THORACOTOMY 10

## (undated) DEVICE — SUT VIC 2 TP1 54IN J880T

## (undated) DEVICE — 2963 MEDIPORE SOFT CLOTH TAPE 3 IN X 10 YD 12 RLS/CS: Brand: 3M™ MEDIPORE™

## (undated) DEVICE — VISUALIZATION SYSTEM: Brand: CLEARIFY

## (undated) DEVICE — MEDI-VAC NON-CONDUCTIVE SUCTION TUBING: Brand: CARDINAL HEALTH